# Patient Record
Sex: FEMALE | Race: WHITE | NOT HISPANIC OR LATINO | URBAN - NONMETROPOLITAN AREA
[De-identification: names, ages, dates, MRNs, and addresses within clinical notes are randomized per-mention and may not be internally consistent; named-entity substitution may affect disease eponyms.]

---

## 2019-12-04 ENCOUNTER — OFFICE VISIT CONVERTED (OUTPATIENT)
Dept: FAMILY MEDICINE CLINIC | Age: 39
End: 2019-12-04
Attending: NURSE PRACTITIONER

## 2019-12-04 ENCOUNTER — HOSPITAL ENCOUNTER (OUTPATIENT)
Dept: OTHER | Facility: HOSPITAL | Age: 39
Discharge: HOME | End: 2019-12-04
Attending: NURSE PRACTITIONER

## 2019-12-04 LAB
ALBUMIN SERPL-MCNC: 4.1 G/DL (ref 3.5–5)
ALBUMIN/GLOB SERPL: 1.5 {RATIO} (ref 1.4–2.6)
ALP SERPL-CCNC: 88 U/L (ref 42–98)
ALT SERPL-CCNC: 9 U/L (ref 10–40)
ANION GAP SERPL CALC-SCNC: 15 MMOL/L (ref 8–19)
APPEARANCE UR: CLEAR
AST SERPL-CCNC: 14 U/L (ref 15–50)
BILIRUB SERPL-MCNC: 0.19 MG/DL (ref 0.2–1.3)
BILIRUB UR QL: NEGATIVE
BUN SERPL-MCNC: 12 MG/DL (ref 5–25)
BUN/CREAT SERPL: 17 {RATIO} (ref 6–20)
CALCIUM SERPL-MCNC: 8.8 MG/DL (ref 8.7–10.4)
CHLORIDE SERPL-SCNC: 102 MMOL/L (ref 99–111)
COLOR UR: YELLOW
CONV BACTERIA: NEGATIVE
CONV CO2: 26 MMOL/L (ref 22–32)
CONV COLLECTION SOURCE (UA): NORMAL
CONV HYALINE CASTS IN URINE MICRO: NORMAL /[LPF]
CONV TOTAL PROTEIN: 6.9 G/DL (ref 6.3–8.2)
CONV UROBILINOGEN IN URINE BY AUTOMATED TEST STRIP: 1 {EHRLICHU}/DL (ref 0.1–1)
CREAT UR-MCNC: 0.71 MG/DL (ref 0.5–0.9)
ERYTHROCYTE [DISTWIDTH] IN BLOOD BY AUTOMATED COUNT: 12.8 % (ref 11.7–14.4)
GFR SERPLBLD BASED ON 1.73 SQ M-ARVRAT: >60 ML/MIN/{1.73_M2}
GLOBULIN UR ELPH-MCNC: 2.8 G/DL (ref 2–3.5)
GLUCOSE SERPL-MCNC: 90 MG/DL (ref 65–99)
GLUCOSE UR QL: NEGATIVE MG/DL
HCT VFR BLD AUTO: 42.7 % (ref 37–47)
HGB BLD-MCNC: 13.5 G/DL (ref 12–16)
HGB UR QL STRIP: NEGATIVE
KETONES UR QL STRIP: NEGATIVE MG/DL
LEUKOCYTE ESTERASE UR QL STRIP: NEGATIVE
MCH RBC QN AUTO: 29.2 PG (ref 27–31)
MCHC RBC AUTO-ENTMCNC: 31.6 G/DL (ref 33–37)
MCV RBC AUTO: 92.4 FL (ref 81–99)
NITRITE UR QL STRIP: NEGATIVE
OSMOLALITY SERPL CALC.SUM OF ELEC: 287 MOSM/KG (ref 273–304)
PH UR STRIP.AUTO: 6 [PH] (ref 5–8)
PLATELET # BLD AUTO: 267 10*3/UL (ref 130–400)
PMV BLD AUTO: 10.7 FL (ref 9.4–12.3)
POTASSIUM SERPL-SCNC: 3.6 MMOL/L (ref 3.5–5.3)
PROT UR QL: NEGATIVE MG/DL
RBC # BLD AUTO: 4.62 10*6/UL (ref 4.2–5.4)
RBC #/AREA URNS HPF: NORMAL /[HPF]
SODIUM SERPL-SCNC: 139 MMOL/L (ref 135–147)
SP GR UR: 1.03 (ref 1–1.03)
SQUAMOUS SPT QL MICRO: NORMAL /[HPF]
TSH SERPL-ACNC: 3.79 M[IU]/L (ref 0.27–4.2)
VIT B12 SERPL-MCNC: 685 PG/ML (ref 211–911)
WBC # BLD AUTO: 9.22 10*3/UL (ref 4.8–10.8)
WBC #/AREA URNS HPF: NORMAL /[HPF]

## 2019-12-05 LAB — MAGNESIUM SERPL-MCNC: 1.95 MG/DL (ref 1.6–2.3)

## 2019-12-06 LAB
CONV ANTI MICROSOMAL AB: 148 IU/ML (ref 0–34)
T4 FREE SERPL-MCNC: 1.1 NG/DL (ref 0.9–1.8)
THYROGLOBULIN ANTIBODY: 3 IU/ML (ref 0–0.9)

## 2019-12-07 LAB — T3FREE SERPL-MCNC: 2.9 PG/ML (ref 2–4.4)

## 2019-12-09 ENCOUNTER — CONVERSION ENCOUNTER (OUTPATIENT)
Dept: CARDIOLOGY | Facility: CLINIC | Age: 39
End: 2019-12-09
Attending: SPECIALIST

## 2019-12-10 ENCOUNTER — HOSPITAL ENCOUNTER (OUTPATIENT)
Dept: OTHER | Facility: HOSPITAL | Age: 39
Discharge: HOME | End: 2019-12-10
Attending: NURSE PRACTITIONER

## 2020-01-21 ENCOUNTER — OFFICE VISIT CONVERTED (OUTPATIENT)
Dept: CARDIOLOGY | Facility: CLINIC | Age: 40
End: 2020-01-21
Attending: INTERNAL MEDICINE

## 2020-01-21 ENCOUNTER — CONVERSION ENCOUNTER (OUTPATIENT)
Dept: CARDIOLOGY | Facility: CLINIC | Age: 40
End: 2020-01-21

## 2020-03-06 ENCOUNTER — HOSPITAL ENCOUNTER (OUTPATIENT)
Dept: OTHER | Facility: HOSPITAL | Age: 40
Discharge: HOME | End: 2020-03-06
Attending: INTERNAL MEDICINE

## 2020-03-06 LAB
T4 FREE SERPL-MCNC: 1.4 NG/DL (ref 0.9–1.8)
TSH SERPL-ACNC: 2.84 M[IU]/L (ref 0.27–4.2)

## 2020-03-07 LAB — T3FREE SERPL-MCNC: 3.1 PG/ML (ref 2–4.4)

## 2020-05-20 ENCOUNTER — OFFICE VISIT CONVERTED (OUTPATIENT)
Dept: CARDIOLOGY | Facility: CLINIC | Age: 40
End: 2020-05-20
Attending: INTERNAL MEDICINE

## 2020-05-20 ENCOUNTER — CONVERSION ENCOUNTER (OUTPATIENT)
Dept: OTHER | Facility: HOSPITAL | Age: 40
End: 2020-05-20

## 2020-06-05 ENCOUNTER — HOSPITAL ENCOUNTER (OUTPATIENT)
Dept: OTHER | Facility: HOSPITAL | Age: 40
Discharge: HOME | End: 2020-06-05
Attending: NURSE PRACTITIONER

## 2020-06-05 LAB
T4 FREE SERPL-MCNC: 1.4 NG/DL (ref 0.9–1.8)
TSH SERPL-ACNC: 2.99 M[IU]/L (ref 0.27–4.2)

## 2020-06-07 LAB — T3FREE SERPL-MCNC: 2.6 PG/ML (ref 2–4.4)

## 2020-06-17 ENCOUNTER — CONVERSION ENCOUNTER (OUTPATIENT)
Dept: CARDIOLOGY | Facility: CLINIC | Age: 40
End: 2020-06-17
Attending: INTERNAL MEDICINE

## 2020-07-31 ENCOUNTER — HOSPITAL ENCOUNTER (OUTPATIENT)
Dept: OTHER | Facility: HOSPITAL | Age: 40
Discharge: HOME | End: 2020-07-31
Attending: NURSE PRACTITIONER

## 2020-07-31 LAB — TSH SERPL-ACNC: 1.96 M[IU]/L (ref 0.27–4.2)

## 2020-10-03 ENCOUNTER — HOSPITAL ENCOUNTER (OUTPATIENT)
Dept: OTHER | Facility: HOSPITAL | Age: 40
Discharge: HOME | End: 2020-10-03
Attending: NURSE PRACTITIONER

## 2020-10-03 LAB — TSH SERPL-ACNC: 2.43 M[IU]/L (ref 0.27–4.2)

## 2020-11-24 ENCOUNTER — OFFICE VISIT CONVERTED (OUTPATIENT)
Dept: CARDIOLOGY | Facility: CLINIC | Age: 40
End: 2020-11-24
Attending: INTERNAL MEDICINE

## 2020-11-24 ENCOUNTER — CONVERSION ENCOUNTER (OUTPATIENT)
Dept: CARDIOLOGY | Facility: CLINIC | Age: 40
End: 2020-11-24

## 2020-12-04 ENCOUNTER — HOSPITAL ENCOUNTER (OUTPATIENT)
Dept: OTHER | Facility: HOSPITAL | Age: 40
Discharge: HOME | End: 2020-12-04
Attending: NURSE PRACTITIONER

## 2020-12-04 LAB
APPEARANCE UR: CLEAR
BACTERIA UR QL AUTO: ABNORMAL
BILIRUB UR QL: NEGATIVE
CASTS URNS QL MICRO: ABNORMAL /[LPF]
COLOR UR: YELLOW
CONV LEUKOCYTE ESTERASE: ABNORMAL
CONV UROBILINOGEN IN URINE BY AUTOMATED TEST STRIP: 0.2 {EHRLICHU}/DL (ref 0.1–1)
EPI CELLS #/AREA URNS HPF: ABNORMAL /[HPF]
GLUCOSE 24H UR-MCNC: NEGATIVE MG/DL
HGB UR QL STRIP: ABNORMAL
KETONES UR QL STRIP: NEGATIVE MG/DL
MUCOUS THREADS URNS QL MICRO: ABNORMAL
NITRITE UR-MCNC: NEGATIVE MG/ML
PH UR STRIP.AUTO: 5.5 [PH] (ref 5–8)
PROT UR-MCNC: NEGATIVE MG/DL
RBC # BLD AUTO: ABNORMAL /[HPF]
SP GR UR STRIP: 1.02 (ref 1–1.03)
SPECIMEN SOURCE: ABNORMAL
UNIDENT CRYS URNS QL MICRO: ABNORMAL /[HPF]
WBC #/AREA URNS HPF: ABNORMAL /[HPF]

## 2021-02-06 ENCOUNTER — HOSPITAL ENCOUNTER (OUTPATIENT)
Dept: OTHER | Facility: HOSPITAL | Age: 41
Discharge: HOME | End: 2021-02-06
Attending: NURSE PRACTITIONER

## 2021-02-06 LAB — TSH SERPL-ACNC: 2.33 M[IU]/L (ref 0.27–4.2)

## 2021-05-10 NOTE — PROCEDURES
"   Procedure Note      Patient Name: April Hawkins   Patient ID: [id number]   Sex: Female   YOB: 1980    Primary Care Provider: Catalino CASEY   Referring Provider: Catalino CASEY    Visit Date: June 17, 2020    Provider: Andre Fowler MD   Location: [facility]   Location Address: [address] [place] [address]  [address] [place] [address]  [id number]   Location Phone: [phone]          FINAL REPORT   TRANSTHORACIC ECHOCARDIOGRAM REPORT    Diagnosis: Palpitations and PVCS   Height: 5'6\" Weight: 192 B/P: 115/89 BSA: 1.97   Tech: JLW   MEASUREMENTS:  RVID (Diastole) : RVID. (NORMAL: 0.7 to 2.4 cm max)   LVID (Systole): 2.6 cm (Diastole): 3.7 cm . (NORMAL: 3.7 - 5.4 cm)   Posterior Wall Thickness (Diastole): 1 cm. (NORMAL: 0.8 - 1.1 cm)   Septal Thickness (Diastole): 0.7 cm. (NORMAL: 0.7 - 1.2 cm)   LAID (Systole): 2.7 cm. (NORMAL: 1.9 - 3.8 cm)   Aortic Root Diameter (Diastole): 2.6 cm. (NORMAL: 2.0 - 3.7 cm)   COMMENTS:  The patient underwent 2-D, M-Mode, and Doppler examination, including pulse-wave, continuous-wave, and color-flow Doppler analysis; the study is technically adequate. The following findings were noted:   FINDINGS:  MITRAL VALVE: Normal in appearance, opens well. No evidence of mitral valve prolapse. No mitral stenosis. There is mild mitral regurgitation.   AORTIC VALVE: Normal trileaflet aortic valve, opens well. No evidence of aortic stenosis or regurgitation on Doppler examination.   TRICUSPID VALVE: Normal in appearance, opens well. Trace tricuspid regurgitation. Normal pulmonary artery systolic pressure.   PULMONIC VALVE: Grossly normal.   AORTIC ROOT: Normal in size with adequate motion.   LEFT ATRIUM: Normal in size. No intracavitary masses or clots seen. LA volume index is 25 mL/m2.   LEFT VENTRICLE: The left ventricular chamber size is normal. The left ventricular wall thickness is normal. Left ventricular systolic function is normal. Estimated LV " ejection fraction is 55 to 60%. There are no regional wall motion abnormalities. Left ventricular diastolic function is normal.   RIGHT VENTRICLE: Normal size and function.   RIGHT ATRIUM: Normal in size.   PERICARDIUM: No evidence of pericardial effusion.   INFERIOR VENA CAVA: Measures 1.5 cm in diameter and there is more than 50% collapse during inspiration.   DOPPLER: E/A ratio is 1.3. DT= 175 msec. IVRT is 99 msec. E/E' is 8.   Faxed: 06/22/2020      CONCLUSION:  1.  Preserved left ventricular systolic function with estimated LV ejection fraction of 55 to 60%.   2.  Normal diastolic function of the left ventricle.  3.  Mild mitral regurgitation.      MD CRISTIANO Rendon/leandra    This note was transcribed by Adenike Mckay.  leandra/cristiano  The above service was transcribed by Adenike Mckay, and I attest to the accuracy of the note.  CRISTIANO                 Electronically Signed by: Nila Mckay-, Other -Author on June 22, 2020 08:50:25 AM  Electronically Co-signed by: Andre Fowler MD -Reviewer on June 22, 2020 01:07:05 PM

## 2021-05-13 NOTE — PROGRESS NOTES
Progress Note      Patient Name: April Hawkins   Patient ID: [id number]   Sex: Female   YOB: 1980    Primary Care Provider: Catalino CASEY   Referring Provider: Catalino CASEY    Visit Date: May 20, 2020    Provider: Andre Fowler MD   Location: [facility]   Location Address: [address] [place] [address]  [address] [place] [address]  [id number]   Location Phone: [phone]          Chief Complaint     Followup visit for palpitations and frequent PVCs.       History Of Present Illness  REFERRING CARE PROVIDER: Catalino CASEY   April Hawknis is a 39 year old /White female with no significant past medical history who was previously seen and evaluated for palpitations. A 24-hour Holter monitor study showed frequent isolated PVCs with no arrhythmias. Low-dose beta-blockers were initiated for symptomatic benefits. Patient reports that after starting the medication, she had an episode of swelling of the feet and tingling and numbness for 3 days, but she stayed on the medication and the symptoms eventually subsided. Seventy-five percent of her symptoms are gone. She still gets palpitations, mostly at night while lying down. She has not cut down on her caffeine intake and drinks Mountain Dew most of the day. She missed her appointment for the echocardiogram.   PAST MEDICAL HISTORY: 1) Hypothyroidism; 2) Negative for diabetes mellitus and hypertension.   PSYCHOSOCIAL HISTORY: Denies mood changes or depression. Denies alcohol or tobacco use.   CURRENT MEDICATIONS: Metoprolol 25 mg 1/2 daily; Unithroid 0.05 mg 1/2 daily. The dosage and frequency of the medications were reviewed with the patient.       Review of Systems  · Cardiovascular  o Admits  o : palpitations (fast, fluttering, or skipping beats), shortness of breath while walking or lying flat  o Denies  o : swelling (feet, ankles, hands), chest pain or angina pectoris   · Respiratory  o Denies  o : chronic or  "frequent cough, asthma or wheezing      Vitals  Date Time BP Position Site L\R Cuff Size HR RR TEMP (F) WT  HT  BMI kg/m2 BSA m2 O2 Sat HC       05/20/2020 12:36 /89 Sitting    78 - R   192lbs 0oz 5'  6\" 30.99 2.01           Physical Examination  · Respiratory  o Auscultation of Lungs  o : Clear to auscultation bilaterally. No crackles or rhonchi.  · Cardiovascular  o Heart  o : S1, S2 normally heard. No S3. No murmur, rubs, or gallops.  · Gastrointestinal  o Abdominal Examination  o : Soft, nontender, nondistended. No free fluid. Bowel sounds heard in all four quadrants.  · Extremities  o Extremities  o : Warm and well perfused. No pitting pedal edema. Distal pulses present.          Assessment     ASSESSMENT & PLAN:    1.  Palpitations/frequent PVCs.  Symptoms are better with metoprolol.  She is tolerating the medication well.         Continue the same.  Will reschedule the echocardiogram.    2.  Will follow with echo report, otherwise follow up in 6 months.      MD CRISTIANO Rendon/farrah             Electronically Signed by: Tere Fleming-, Other -Author on May 29, 2020 09:27:51 AM  Electronically Co-signed by: Andre Fowler MD -Reviewer on May 29, 2020 10:19:59 AM  "

## 2021-05-13 NOTE — PROGRESS NOTES
"   Progress Note      Patient Name: April Hawkins   Patient ID: [id number]   Sex: Female   YOB: 1980    Primary Care Provider: Catalino CASEY   Referring Provider: Catalino CASEY    Visit Date: November 24, 2020    Provider: Andre Fowler MD   Location: [facility]   Location Address: [address] [place] [address]  [address] [place] [address]  [id number]   Location Phone: [phone]          Chief Complaint  · Follow-up visit for palpitations and frequent PVCs       History Of Present Illness  REFERRING CARE PROVIDER: Catalino CASEY   April Hawkins is a 40-year-old female with palpitations due to frequent isolated PVCs, who is here for a follow-up visit. Last seen in the office in May of this year. Patient has significantly cut down her caffeine intake and is taking Metoprolol as prescribed. Currently she denies having any major palpitations. No dizziness or syncopal episodes. No generalized weakness. She denies chest pain or shortness of breath.   PAST MEDICAL HISTORY: (1) Hypothyroidism. (2) Negative for diabetes mellitus and hypertension. (3) Frequent PVCs [4.9% PVC burden per 24-hour Holter Monitor done in December 2019].   PSYCHOSOCIAL HISTORY: She never used alcohol.   CURRENT MEDICATIONS: include Metoprolol 25 mg 1/2 tablet daily; Levothyroxine 50 mcg daily. The dosage and frequency of the medications were reviewed with the patient.      ALLERGIES:  No known drug allergies.       Review of Systems  · Cardiovascular  o Denies  o : palpitations (fast, fluttering, or skipping beats), swelling (feet, ankles, hands), shortness of breath while walking or lying flat, chest pain or angina pectoris   · Respiratory  o Denies  o : chronic or frequent cough, asthma or wheezing      Vitals  Date Time BP Position Site L\R Cuff Size HR RR TEMP (F) WT  HT  BMI kg/m2 BSA m2 O2 Sat FR L/min FiO2 HC       11/24/2020 11:57 /80 Sitting    70 - R   197lbs 0oz 5'  6\" 31.8 2.04     "         Physical Examination  · Respiratory  o Auscultation of Lungs  o : Clear to auscultation bilaterally. No crackles or rhonchi.  · Cardiovascular  o Heart  o : S1, S2 is normally heard. No S3. No murmur, rubs, or gallops.  · Gastrointestinal  o Abdominal Examination  o : Soft, nontender, nondistended. No free fluid. Bowel sounds heard in all four quadrants.  · Extremities  o Extremities  o : Warm and well perfused. No pitting pedal edema. Distal pulses present.     Labs done on 10/03/2020 showed a TSH of 2.43.           Assessment     ASSESSMENT AND PLAN:    1.  Palpitations:  Due to frequent isolated PVCs.  Symptoms are significantly better after cutting down the        caffeine intake.  Will continue low-dose Metoprolol at the same time.  2.  Follow up in one year or earlier if needed.    MD CRISTIANO Rendon/bam           This note was transcribed by Aimee Villafana.  bam/CRISTIANO  The above service was transcribed by Aimee Villafana, and I attest to the accuracy of the note.  CRISTIANO               Electronically Signed by: Aimee Villafana-, -Author on December 1, 2020 09:50:38 AM  Electronically Co-signed by: Andre Fowler MD -Reviewer on December 1, 2020 11:00:41 AM

## 2021-05-14 VITALS
DIASTOLIC BLOOD PRESSURE: 80 MMHG | HEART RATE: 70 BPM | HEIGHT: 66 IN | SYSTOLIC BLOOD PRESSURE: 116 MMHG | WEIGHT: 197 LBS | BODY MASS INDEX: 31.66 KG/M2

## 2021-05-15 VITALS
HEART RATE: 81 BPM | WEIGHT: 187 LBS | BODY MASS INDEX: 30.05 KG/M2 | DIASTOLIC BLOOD PRESSURE: 79 MMHG | SYSTOLIC BLOOD PRESSURE: 109 MMHG | HEIGHT: 66 IN

## 2021-05-15 VITALS
BODY MASS INDEX: 30.86 KG/M2 | SYSTOLIC BLOOD PRESSURE: 115 MMHG | DIASTOLIC BLOOD PRESSURE: 89 MMHG | HEART RATE: 78 BPM | WEIGHT: 192 LBS | HEIGHT: 66 IN

## 2021-05-18 NOTE — PROGRESS NOTES
"Maria Eugenia Wilks  1980     Office/Outpatient Visit    Visit Date: Wed, Dec 4, 2019 02:37 pm    Provider: Jey Bae N.P. (Assistant: Remedios Silva MA)    Location: [facility]        Electronically signed by Jey Bae N.P. on  12/06/2019 10:16:39 AM                             Subjective:        CC: Ms. Wilks is a 39 year old White female.  This is her first visit to the clinic.  pt complains of a \"rapid, fluttering feeling\" in chest for past 6 weeks;         HPI:           PHQ-9 Depression Screening: Completed form scanned and in chart; Total Score 1           In regard to the palpitations, this first began approximately 6 weeks ago.  She describes the sensation as rapid heart beat and occasional skipped beats.  This is moderately bothersome.  The frequency of the episodes is several times per day.  The average duration of each episode is a few seconds to few minutes.  She states that it is worsened after notices it more at night but does happen when out doing things.  Nothing seems to alleviate the dizziness.  Associated symptoms include blurred vision, chest pain or pressure, dizziness and shortness of breath.  She denies double vision, headache, syncope, nausea, tinnitus or vomiting.  No new medications have been recently added.  Pertinent medical history is unremarkable.      ROS:     CONSTITUTIONAL:  Negative for chills, fatigue, fever, and weight change.      CARDIOVASCULAR:  Positive for palpitations.   Negative for chest pain, orthopnea, paroxysmal nocturnal dyspnea or pedal edema.      RESPIRATORY:  Negative for dyspnea.      GASTROINTESTINAL:  Negative for abdominal pain, constipation, diarrhea, nausea and vomiting.      NEUROLOGICAL:  Positive for dizziness and headaches ( migraine; hx of in the past ).   Negative for memory loss, paresthesias, seizures or tremor.      PSYCHIATRIC:  Negative for anxiety, depression, and sleep disturbances.          Past Medical " History / Family History / Social History:         Last Reviewed on 2019 03:04 PM by Jey Bae    Past Medical History:             PREVENTIVE HEALTH MAINTENANCE             MAMMOGRAM: has never been done will this coming year     PAP SMEAR: was last done 2019 with normal results             PAST MEDICAL HISTORY             GYNECOLOGICAL HISTORY:        No abnormal Paps         Surgical History:         Breast Augmentation - -saline and again 2018 -ruptured replaced with gel , below the muscle.      Varicose Vein Stripping     Weight loss with skin removal around stomach, legs , arm , separate surgeries 2017     Tonsillectomy; age  5;      section: X 2; , ;         Family History:     Father:    Positive for Hypertension and Hyperlipidemia;     ; Positive for Probable Bipolar disease;     Mother:    Positive for Hypertension and Hyperlipidemia;     ; Positive for Type 1 Diabetes ( dx age 64 ) and Hypothyroidism;     Brother(s): 1 brother - possible Bipolar brother(s) total     Sister(s): 1 sister , Bipolar sister(s) total     Son(s): 1 son healthy son(s) total     Daughter(s): 1 daugher healhty ,  daughter(s) total         Social History:     Occupation: [employer] service and [employer]     Marital Status:      Children: 2 children         Tobacco/Alcohol/Supplements:     Last Reviewed on 2019 03:04 PM by Jey Bae    Tobacco: She has never smoked.          Substance Abuse History:     Last Reviewed on 2019 03:04 PM by Jey Bae        Mental Health History:     Last Reviewed on 2019 03:04 PM by Jey Bae        Communicable Diseases (eg STDs):     Last Reviewed on 2019 03:04 PM by Jey Bae        Current Problems:     Last Reviewed on 2019 03:04 PM by Jey Bae    Encounter for screening for depression        Immunizations:     None        Allergies:     Last Reviewed on 2019 03:04  PM by Jey Bae    Keflex: Swelling         Current Medications:     Last Reviewed on 12/04/2019 03:04 PM by Jey Bae    None        Objective:        Vitals:         Current: 12/4/2019 2:47:05 PM    Ht:  5 ft, 6 in;  Wt: 183 lbs;  BMI: 29.5T: 98.2 F (oral);  BP: 101/73 mm Hg (left arm, sitting);  P: 94 bpm (left arm (BP Cuff), sitting)        Repeat:     3:23:13 PM  BP:   110/78mm Hg (left arm, lying, HR: 78) 3:23:58 PM  BP:   106/79mm Hg (left arm, sitting, HR: 92) 3:24:32 PM  BP:   102/86mm Hg (left arm, standing, HR: 109)     Exams:     PHYSICAL EXAM:     GENERAL: vital signs recorded - well developed, well nourished;  no apparent distress;     E/N/T:  normal EACs, TMs, nasal/oral mucosa, teeth, gingiva, and oropharynx;     NECK: range of motion is normal; thyroid is non-palpable; carotid exam is normal with good upstroke and no bruits;     RESPIRATORY: normal respiratory rate and pattern with no distress; normal breath sounds with no rales, rhonchi, wheezes or rubs;     CARDIOVASCULAR: mildly tachycardic;  rhythm is regular;  no systolic murmur; no edema;     GASTROINTESTINAL: nontender; normal bowel sounds; no organomegaly;     NEUROLOGICAL:  cranial nerves, motor and sensory function, reflexes, gait and coordination are all intact;     PSYCHIATRIC:  appropriate affect and demeanor; normal speech pattern; grossly normal memory;         Procedures:     Palpitations        Holter Monitor: Holter monitor was hooked up, Ms. Wilks was given instructions on use.  Patient informed to return with device. 48 hour monitor tls             Assessment:         Z13.31   Encounter for screening for depression       R00.2   Palpitations           ORDERS:         Radiology/Test Orders:       10433  Holter monitor connection and disconnection  (In-House)              Lab Orders:       59739  VB12 - [facility] Vitamin B12  (Send-Out)            89568  BDCB2 - H CBC w/o diff  (Send-Out)            06159  COMP - [facility]  Comp. Metabolic Panel  (Send-Out)            58523  AMSA - [facility] Microsomal Antibodies  (Send-Out)            77494  ARIANA - [facility] Thyroglobulin antibody  (Send-Out)            72863  TSH - [facility] TSH  (Send-Out)            36097  BDUAM - [facility] Urinalysis, automated, with micro  (Send-Out)            ORTHO  Orthostatic blood pressure  (In-House)              Other Orders:         Depression screen negative  (In-House)                      Plan:         Encounter for screening for depressionKasper new pt, # [id number] neg, dmt    MIPS PHQ-9 Depression Screening: Completed form scanned and in chart; Total Score 1; Negative Depression Screen           Orders:         Depression screen negative  (In-House)              Palpitations    LABORATORY:  Labs ordered to be performed today include B12, CBC W/O DIFF, Comprehensive metabolic panel, Thyroid Other ANTI MICROSOMAL ANTIBODY ANTITHYROGLOBULIN ANTIBODY, TSH, and urinalysis with micro.      TESTS/PROCEDURES:  Will proceed with Holter Monitor 48 hour to be performed/scheduled now.      RECOMMENDATIONS given include: Further recommendation to be given after test results are complete.            Orders:       11458  VB12 - [facility] Vitamin B12  (Send-Out)            74920  BDCB2 - [facility] CBC w/o diff  (Send-Out)            99475  COMP - [facility] Comp. Metabolic Panel  (Send-Out)            34740  AMSA - [facility] Microsomal Antibodies  (Send-Out)            33366  ARIANA - [facility] Thyroglobulin antibody  (Send-Out)            50456  TSH - [facility] TSH  (Send-Out)            86815  BDUAM - [facility] Urinalysis, automated, with micro  (Send-Out)            ORTHO  Orthostatic blood pressure  (In-House)            74957  Holter monitor connection and disconnection  (In-House)                  Charge Capture:         Primary Diagnosis:     Z13.31  Encounter for screening for depression           Orders:      81041  Office visit - new pt, level 3  (In-House)              Depression screen negative  (In-House)               R00.2  Palpitations           Orders:      ORTHO  Orthostatic blood pressure  (In-House)            95914  Holter monitor connection and disconnection  (In-House)                  ADDENDUMS:      ____________________________________    Addendum: 12/07/2019 11:08 AM - Basia Abbasi        Holter returned on 12/7/19 & uploaded to [facility]

## 2021-07-01 VITALS
HEIGHT: 66 IN | TEMPERATURE: 98.2 F | HEART RATE: 94 BPM | WEIGHT: 183 LBS | DIASTOLIC BLOOD PRESSURE: 86 MMHG | SYSTOLIC BLOOD PRESSURE: 102 MMHG | BODY MASS INDEX: 29.41 KG/M2

## 2021-08-11 ENCOUNTER — TRANSCRIBE ORDERS (OUTPATIENT)
Dept: ADMINISTRATIVE | Facility: HOSPITAL | Age: 41
End: 2021-08-11

## 2021-08-11 ENCOUNTER — LAB (OUTPATIENT)
Dept: LAB | Facility: HOSPITAL | Age: 41
End: 2021-08-11

## 2021-08-11 DIAGNOSIS — Z00.00 ROUTINE GENERAL MEDICAL EXAMINATION AT A HEALTH CARE FACILITY: ICD-10-CM

## 2021-08-11 DIAGNOSIS — Z00.00 ROUTINE GENERAL MEDICAL EXAMINATION AT A HEALTH CARE FACILITY: Primary | ICD-10-CM

## 2021-08-11 LAB
ALBUMIN SERPL-MCNC: 4.3 G/DL (ref 3.5–5.2)
ALBUMIN/GLOB SERPL: 1.5 G/DL
ALP SERPL-CCNC: 77 U/L (ref 39–117)
ALT SERPL W P-5'-P-CCNC: 11 U/L (ref 1–33)
ANION GAP SERPL CALCULATED.3IONS-SCNC: 12 MMOL/L (ref 5–15)
AST SERPL-CCNC: 12 U/L (ref 1–32)
BASOPHILS # BLD AUTO: 0.05 10*3/MM3 (ref 0–0.2)
BASOPHILS NFR BLD AUTO: 0.5 % (ref 0–1.5)
BILIRUB SERPL-MCNC: 0.4 MG/DL (ref 0–1.2)
BUN SERPL-MCNC: 14 MG/DL (ref 6–20)
BUN/CREAT SERPL: 18.9 (ref 7–25)
CALCIUM SPEC-SCNC: 9.4 MG/DL (ref 8.6–10.5)
CHLORIDE SERPL-SCNC: 102 MMOL/L (ref 98–107)
CHOLEST SERPL-MCNC: 180 MG/DL (ref 0–200)
CO2 SERPL-SCNC: 26 MMOL/L (ref 22–29)
CREAT SERPL-MCNC: 0.74 MG/DL (ref 0.57–1)
DEPRECATED RDW RBC AUTO: 43.5 FL (ref 37–54)
EOSINOPHIL # BLD AUTO: 0.09 10*3/MM3 (ref 0–0.4)
EOSINOPHIL NFR BLD AUTO: 0.9 % (ref 0.3–6.2)
ERYTHROCYTE [DISTWIDTH] IN BLOOD BY AUTOMATED COUNT: 13 % (ref 12.3–15.4)
GFR SERPL CREATININE-BSD FRML MDRD: 86 ML/MIN/1.73
GLOBULIN UR ELPH-MCNC: 2.8 GM/DL
GLUCOSE SERPL-MCNC: 87 MG/DL (ref 65–99)
HCT VFR BLD AUTO: 42.9 % (ref 34–46.6)
HDLC SERPL-MCNC: 64 MG/DL (ref 40–60)
HGB BLD-MCNC: 14 G/DL (ref 12–15.9)
IMM GRANULOCYTES # BLD AUTO: 0.01 10*3/MM3 (ref 0–0.05)
IMM GRANULOCYTES NFR BLD AUTO: 0.1 % (ref 0–0.5)
LDLC SERPL CALC-MCNC: 104 MG/DL (ref 0–100)
LDLC/HDLC SERPL: 1.61 {RATIO}
LYMPHOCYTES # BLD AUTO: 2.27 10*3/MM3 (ref 0.7–3.1)
LYMPHOCYTES NFR BLD AUTO: 22.7 % (ref 19.6–45.3)
MCH RBC QN AUTO: 29.4 PG (ref 26.6–33)
MCHC RBC AUTO-ENTMCNC: 32.6 G/DL (ref 31.5–35.7)
MCV RBC AUTO: 89.9 FL (ref 79–97)
MONOCYTES # BLD AUTO: 0.55 10*3/MM3 (ref 0.1–0.9)
MONOCYTES NFR BLD AUTO: 5.5 % (ref 5–12)
NEUTROPHILS NFR BLD AUTO: 7.03 10*3/MM3 (ref 1.7–7)
NEUTROPHILS NFR BLD AUTO: 70.3 % (ref 42.7–76)
PLATELET # BLD AUTO: 235 10*3/MM3 (ref 140–450)
PMV BLD AUTO: 10.3 FL (ref 6–12)
POTASSIUM SERPL-SCNC: 4.7 MMOL/L (ref 3.5–5.2)
PROT SERPL-MCNC: 7.1 G/DL (ref 6–8.5)
RBC # BLD AUTO: 4.77 10*6/MM3 (ref 3.77–5.28)
SODIUM SERPL-SCNC: 140 MMOL/L (ref 136–145)
TRIGL SERPL-MCNC: 65 MG/DL (ref 0–150)
TSH SERPL DL<=0.05 MIU/L-ACNC: 1.95 UIU/ML (ref 0.27–4.2)
VLDLC SERPL-MCNC: 12 MG/DL (ref 5–40)
WBC # BLD AUTO: 10 10*3/MM3 (ref 3.4–10.8)

## 2021-08-11 PROCEDURE — 36415 COLL VENOUS BLD VENIPUNCTURE: CPT

## 2021-08-11 PROCEDURE — 85025 COMPLETE CBC W/AUTO DIFF WBC: CPT

## 2021-08-11 PROCEDURE — 84443 ASSAY THYROID STIM HORMONE: CPT

## 2021-08-11 PROCEDURE — 80053 COMPREHEN METABOLIC PANEL: CPT

## 2021-08-11 PROCEDURE — 82306 VITAMIN D 25 HYDROXY: CPT

## 2021-08-11 PROCEDURE — 82607 VITAMIN B-12: CPT

## 2021-08-11 PROCEDURE — 80061 LIPID PANEL: CPT

## 2021-08-12 LAB
25(OH)D3 SERPL-MCNC: 60.5 NG/ML
VIT B12 BLD-MCNC: 1940 PG/ML (ref 211–946)

## 2021-08-23 ENCOUNTER — TELEPHONE (OUTPATIENT)
Dept: FAMILY MEDICINE CLINIC | Age: 41
End: 2021-08-23

## 2021-08-23 NOTE — TELEPHONE ENCOUNTER
Caller: April Hawkins    Relationship to patient: Self    Best call back number: [phone]    Patient is needing: PATIENT CALLED WANTING TO RESCHEDULE WITH HER CARDIOLOGIST ANISA CERRATO. SHE WANTS TO BE SEEN SOONER THAN 12/01/2021. PATIENT WOULD LIKE A CALL BACK PLEASE ADVISE THANK YOU.       HUB STAFF UNABLE TO WARM TRANSFER

## 2021-10-01 ENCOUNTER — OFFICE VISIT (OUTPATIENT)
Dept: FAMILY MEDICINE CLINIC | Age: 41
End: 2021-10-01

## 2021-10-01 VITALS — DIASTOLIC BLOOD PRESSURE: 65 MMHG | TEMPERATURE: 98 F | HEART RATE: 76 BPM | SYSTOLIC BLOOD PRESSURE: 123 MMHG

## 2021-10-01 DIAGNOSIS — E03.9 HYPOTHYROIDISM, UNSPECIFIED TYPE: Chronic | ICD-10-CM

## 2021-10-01 DIAGNOSIS — Z00.00 ANNUAL PHYSICAL EXAM: Primary | ICD-10-CM

## 2021-10-01 PROBLEM — R00.2 PALPITATIONS: Chronic | Status: ACTIVE | Noted: 2020-10-01

## 2021-10-01 PROCEDURE — 99396 PREV VISIT EST AGE 40-64: CPT | Performed by: NURSE PRACTITIONER

## 2021-10-01 RX ORDER — LEVOTHYROXINE SODIUM 0.05 MG/1
50 TABLET ORAL DAILY
COMMUNITY
End: 2021-10-01 | Stop reason: SDUPTHER

## 2021-10-01 RX ORDER — LEVOTHYROXINE SODIUM 0.05 MG/1
50 TABLET ORAL DAILY
Qty: 90 TABLET | Refills: 3 | Status: SHIPPED | OUTPATIENT
Start: 2021-10-01 | End: 2021-10-16 | Stop reason: SDUPTHER

## 2021-10-01 NOTE — PROGRESS NOTES
Mode of Visit: Video  You have chosen to receive care through a telehealth visit.  Do you consent to use a video/audio connection for your medical care today? YES   The visit included audio and video interaction. No technical issues occurred during this visit.      Subjective    Telehealth visit for yearly exam. LMP 9/18/2021, last Mammogram 11/2020 wnl, Last PAP 10/2020 wnl, labs done 8/11/2021 all stable. Sees Dr Cruz for palpitations taking Metoprolol 1/2 tablet daily , is stable, has been seeing Dr. Barr for her thyroid but wants to stay with [facility] instead.       Chief Complaint   Patient presents with   • Annual Exam         HPI:        April Hawkins is a 41 y.o. female who presents to  [facility]  [facility] today     Review of Systems   Constitutional: Negative for appetite change, chills and fever.   HENT: Negative.    Respiratory: Negative for cough, shortness of breath and wheezing.    Cardiovascular: Negative for chest pain, palpitations and leg swelling.   Gastrointestinal: Negative for abdominal pain.   Genitourinary: Negative for difficulty urinating and dysuria.   Musculoskeletal: Negative for gait problem.   Neurological: Negative for dizziness, tremors and seizures.   Psychiatric/Behavioral: Negative for behavioral problems and suicidal ideas.         Vital signs stated per patient from home machine    PE:   Objective   /65   Pulse 76   Temp 98 °F (36.7 °C)   LMP 09/18/2021 (Exact Date)   Breastfeeding No     There is no height or weight on file to calculate BMI.    Physical Exam   Constitutional: She appears well-developed and well-nourished.   Eyes: Pupils are equal, round, and reactive to light.   Pulmonary/Chest: Effort normal.   Musculoskeletal: Normal range of motion.   Neurological: She is alert.   Psychiatric: She has a normal mood and affect.   Vitals reviewed.             Vitamin B12 (08/11/2021 12:43)  Lipid Panel (08/11/2021  12:43)  CBC & Differential (08/11/2021 12:43)  Comprehensive Metabolic Panel (08/11/2021 12:43)  TSH (08/11/2021 12:43)  Vitamin D 25 Hydroxy (08/11/2021 12:43)           SCANNED - MAMMO (09/20/2021)             A/P:     Assessment/Plan   Diagnoses and all orders for this visit:    1. Annual physical exam (Primary)    2. Hypothyroidism, unspecified type  -     levothyroxine (SYNTHROID, LEVOTHROID) 50 MCG tablet; Take 1 tablet by mouth Daily.  Dispense: 90 tablet; Refill: 3              Follow up:    Return in about 1 year (around 10/1/2022), or if symptoms worsen or fail to improve.

## 2021-10-16 ENCOUNTER — DOCUMENTATION (OUTPATIENT)
Dept: FAMILY MEDICINE CLINIC | Age: 41
End: 2021-10-16

## 2021-10-16 DIAGNOSIS — E03.9 HYPOTHYROIDISM, UNSPECIFIED TYPE: Primary | Chronic | ICD-10-CM

## 2021-10-16 RX ORDER — LEVOTHYROXINE SODIUM 0.05 MG/1
50 TABLET ORAL DAILY
Qty: 90 TABLET | Refills: 1 | Status: SHIPPED | OUTPATIENT
Start: 2021-10-16 | End: 2022-03-24 | Stop reason: SDUPTHER

## 2021-12-01 ENCOUNTER — OFFICE VISIT (OUTPATIENT)
Dept: CARDIOLOGY | Facility: CLINIC | Age: 41
End: 2021-12-01

## 2021-12-01 VITALS
BODY MASS INDEX: 32.65 KG/M2 | HEART RATE: 77 BPM | HEIGHT: 65 IN | SYSTOLIC BLOOD PRESSURE: 120 MMHG | WEIGHT: 196 LBS | DIASTOLIC BLOOD PRESSURE: 80 MMHG

## 2021-12-01 DIAGNOSIS — I49.3 FREQUENT PVCS: Primary | ICD-10-CM

## 2021-12-01 PROCEDURE — 99213 OFFICE O/P EST LOW 20 MIN: CPT | Performed by: INTERNAL MEDICINE

## 2021-12-09 PROBLEM — I49.3 FREQUENT PVCS: Status: ACTIVE | Noted: 2021-12-09

## 2021-12-09 NOTE — ASSESSMENT & PLAN NOTE
Symptoms were well controlled in the past however currently experiencing more frequent palpitations.  She was off metoprolol for some time but recently started back without much benefit.  We will proceed with another Holter monitor study to assess the PVC burden and rule out any significant arrhythmias before making further medication changes.  Recent labs showed normal thyroid panel.

## 2021-12-09 NOTE — PROGRESS NOTES
CARDIOLOGY FOLLOW-UP PROGRESS NOTE        Chief Complaint  Follow-up and Palpitations    Subjective            April Hawkins presents to [facility] CARDIOLOGY  History of Present Illness    Ms. Hawkins is here for annual follow-up visit.  She was previously seen evaluated for palpitations and Holter monitor study showed frequent PVCs.  She was on metoprolol symptoms are very well controlled.  Patient stopped taking metoprolol for some time since she has not had any symptoms.  However she started having palpitations again hence restarted the medication.  The palpitations described as skipped beats is still persisting and they are more frequent now.  Denies any dizziness or syncopal episodes.  Denies chest pain or shortness of breath.       Past History:    (1) Hypothyroidism. (2) Negative for diabetes mellitus and hypertension. (3) Frequent PVCs [4.9% PVC burden per 24-hour Holter Monitor done in 2019].     Medical History:  Past Medical History:   Diagnosis Date   • Hypothyroidism    • Obesity    • Palpitations 10/01/2020       Surgical History: has a past surgical history that includes  section ();  section (); and Cosmetic surgery ().     Family History: family history includes Anxiety disorder in her sister; Arthritis in her mother; Depression in her sister; Diabetes in her mother; No Known Problems in her brother.     Social History: reports that she has never smoked. She has never used smokeless tobacco. She reports that she does not drink alcohol and does not use drugs.    Allergies: Cephalexin and Adhesive tape    Current Outpatient Medications on File Prior to Visit   Medication Sig   • levothyroxine (SYNTHROID, LEVOTHROID) 50 MCG tablet Take 1 tablet by mouth Daily for 90 days.   • Metoprolol Succinate 25 MG capsule extended-release 24 hour sprinkle Take 12.5 mg by mouth Daily.     No current facility-administered medications on file prior to visit.     "      Review of Systems   Respiratory: Negative for cough, shortness of breath and wheezing.    Cardiovascular: Positive for palpitations. Negative for chest pain and leg swelling.   Gastrointestinal: Negative for nausea and vomiting.   Neurological: Negative for dizziness and syncope.        Objective     /80   Pulse 77   Ht 165.1 cm (65\")   Wt 88.9 kg (196 lb)   BMI 32.62 kg/m²       Physical Exam    General : Alert, awake, no acute distress  Neck : Supple, no carotid bruit, no jugular venous distention  CVS : Regular rate and rhythm, no murmur, rubs or gallops  Lungs: Clear to auscultation bilaterally, no crackles or rhonchi  Abdomen: Soft, nontender, bowel sounds heard in all 4 quadrants  Extremities: Warm, well-perfused, no pedal edema    Result Review :     The following data was reviewed by: Andre Fowler MD on 12/01/2021:    CMP    CMP 8/11/21   Glucose 87   BUN 14   Creatinine 0.74   eGFR Non African Am 86   Sodium 140   Potassium 4.7   Chloride 102   Calcium 9.4   Albumin 4.30   Total Bilirubin 0.4   Alkaline Phosphatase 77   AST (SGOT) 12   ALT (SGPT) 11           CBC    CBC 8/11/21   WBC 10.00   RBC 4.77   Hemoglobin 14.0   Hematocrit 42.9   MCV 89.9   MCH 29.4   MCHC 32.6   RDW 13.0   Platelets 235           TSH    TSH 2/6/21 8/11/21   TSH 2.330 1.950           Lipid Panel    Lipid Panel 8/11/21   Total Cholesterol 180   Triglycerides 65   HDL Cholesterol 64 (A)   VLDL Cholesterol 12   LDL Cholesterol  104 (A)   LDL/HDL Ratio 1.61   (A) Abnormal value                 Data reviewed: Cardiology studies      Echocardiogram done on June 17, 2020 showed    1.  Preserved left ventricular systolic function with estimated LV ejection fraction of 55 to 60%.   2.  Normal diastolic function of the left ventricle.  3.  Mild mitral regurgitation.                   Assessment and Plan        Diagnoses and all orders for this visit:    1. Frequent PVCs (Primary)  Assessment & Plan:  Symptoms were well " controlled in the past however currently experiencing more frequent palpitations.  She was off metoprolol for some time but recently started back without much benefit.  We will proceed with another Holter monitor study to assess the PVC burden and rule out any significant arrhythmias before making further medication changes.  Recent labs showed normal thyroid panel.    Orders:  -     Holter Monitor - 48 Hour; Future            Follow Up     Return Will make f/u appointment after revewing holter reports.    Patient was given instructions and counseling regarding her condition or for health maintenance advice. Please see specific information pulled into the AVS if appropriate.

## 2021-12-10 ENCOUNTER — LAB (OUTPATIENT)
Dept: LAB | Facility: HOSPITAL | Age: 41
End: 2021-12-10

## 2021-12-10 ENCOUNTER — TELEPHONE (OUTPATIENT)
Dept: CARDIOLOGY | Facility: CLINIC | Age: 41
End: 2021-12-10

## 2021-12-10 DIAGNOSIS — R07.89 CHEST PAIN, ATYPICAL: ICD-10-CM

## 2021-12-10 DIAGNOSIS — I49.3 FREQUENT PVCS: Primary | ICD-10-CM

## 2021-12-10 DIAGNOSIS — I49.3 FREQUENT PVCS: ICD-10-CM

## 2021-12-10 LAB
ANION GAP SERPL CALCULATED.3IONS-SCNC: 4.5 MMOL/L (ref 5–15)
BUN SERPL-MCNC: 13 MG/DL (ref 6–20)
BUN/CREAT SERPL: 17.3 (ref 7–25)
CALCIUM SPEC-SCNC: 9.4 MG/DL (ref 8.6–10.5)
CHLORIDE SERPL-SCNC: 107 MMOL/L (ref 98–107)
CO2 SERPL-SCNC: 27.5 MMOL/L (ref 22–29)
CREAT SERPL-MCNC: 0.75 MG/DL (ref 0.57–1)
GFR SERPL CREATININE-BSD FRML MDRD: 85 ML/MIN/1.73
GLUCOSE SERPL-MCNC: 101 MG/DL (ref 65–99)
MAGNESIUM SERPL-MCNC: 2 MG/DL (ref 1.6–2.6)
POTASSIUM SERPL-SCNC: 4.9 MMOL/L (ref 3.5–5.2)
SODIUM SERPL-SCNC: 139 MMOL/L (ref 136–145)
T4 FREE SERPL-MCNC: 1.48 NG/DL (ref 0.93–1.7)
TSH SERPL DL<=0.05 MIU/L-ACNC: 2.47 UIU/ML (ref 0.27–4.2)

## 2021-12-10 PROCEDURE — 84443 ASSAY THYROID STIM HORMONE: CPT

## 2021-12-10 PROCEDURE — 80048 BASIC METABOLIC PNL TOTAL CA: CPT

## 2021-12-10 PROCEDURE — 36415 COLL VENOUS BLD VENIPUNCTURE: CPT

## 2021-12-10 PROCEDURE — 84439 ASSAY OF FREE THYROXINE: CPT

## 2021-12-10 PROCEDURE — 83735 ASSAY OF MAGNESIUM: CPT

## 2021-12-10 NOTE — TELEPHONE ENCOUNTER
----- Message from Andre Fowler MD sent at 12/9/2021  5:01 PM EST -----  This study is abnormal.  The total PVC burden is less than 1%.  However there were 2 runs of PVCs.  One of them lasted for 7.6 seconds with heart beating very fast.  She reported palpitations at that time.  Most of the symptomatic episodes reported by the patient coincided with the presence of PVCs on monitor strips.    Recommend to have the following things done soon.    1.  Increase the dose of Toprol-XL to 12.5 mg  twice daily.    2.  BMP, TSH, Free T4 and Mag levels to done now (we are aware that, she had labs done in August but need a repeat lab due to abnormal Holter monitor study, diagnosis : Frequent PVCs)     3.  CT coronary angiogram with 3D reconstruction to be done to rule out any blockages causing this significant extra heartbeats (diagnosis : Chest pain, frequent PVCs)      Electronically signed by Andre Fowler MD, 12/09/21, 5:01 PM EST.

## 2021-12-13 ENCOUNTER — TELEPHONE (OUTPATIENT)
Dept: CARDIOLOGY | Facility: CLINIC | Age: 41
End: 2021-12-13

## 2021-12-13 NOTE — TELEPHONE ENCOUNTER
----- Message from Soumya Sosa RN sent at 12/13/2021  7:52 AM EST -----    ----- Message -----  From: Andre Fowler MD  Sent: 12/11/2021   6:54 PM EST  To: Cathy Cordero RN    Thyroid panel, Magnesium, potassium and all other labs are within normal limits.     We will wait for CT coronary angiogram before making further recommendations.     Continue metoprolol

## 2021-12-16 DIAGNOSIS — R00.2 PALPITATIONS: ICD-10-CM

## 2021-12-16 DIAGNOSIS — I49.3 FREQUENT PVCS: Primary | ICD-10-CM

## 2021-12-16 RX ORDER — METOPROLOL SUCCINATE 25 MG/1
TABLET, EXTENDED RELEASE ORAL
Qty: 90 TABLET | Refills: 3 | Status: SHIPPED | OUTPATIENT
Start: 2021-12-16 | End: 2022-01-17

## 2022-01-17 ENCOUNTER — HOSPITAL ENCOUNTER (OUTPATIENT)
Dept: CT IMAGING | Facility: HOSPITAL | Age: 42
Discharge: HOME | End: 2022-01-17
Admitting: INTERNAL MEDICINE

## 2022-01-17 ENCOUNTER — DOCUMENTATION (OUTPATIENT)
Dept: CARDIOLOGY | Facility: CLINIC | Age: 42
End: 2022-01-17

## 2022-01-17 VITALS
HEART RATE: 71 BPM | SYSTOLIC BLOOD PRESSURE: 110 MMHG | BODY MASS INDEX: 31.34 KG/M2 | RESPIRATION RATE: 18 BRPM | WEIGHT: 195 LBS | OXYGEN SATURATION: 99 % | DIASTOLIC BLOOD PRESSURE: 82 MMHG | HEIGHT: 66 IN

## 2022-01-17 DIAGNOSIS — I49.3 FREQUENT PVCS: ICD-10-CM

## 2022-01-17 DIAGNOSIS — R00.2 PALPITATIONS: ICD-10-CM

## 2022-01-17 DIAGNOSIS — R07.89 CHEST PAIN, ATYPICAL: ICD-10-CM

## 2022-01-17 LAB
CREAT BLDA-MCNC: 0.8 MG/DL (ref 0.6–1.3)
QT INTERVAL: 389 MS

## 2022-01-17 PROCEDURE — 82565 ASSAY OF CREATININE: CPT

## 2022-01-17 PROCEDURE — 0 IOPAMIDOL PER 1 ML: Performed by: INTERNAL MEDICINE

## 2022-01-17 PROCEDURE — 75574 CT ANGIO HRT W/3D IMAGE: CPT

## 2022-01-17 PROCEDURE — 93005 ELECTROCARDIOGRAM TRACING: CPT | Performed by: INTERNAL MEDICINE

## 2022-01-17 PROCEDURE — 75574 CT ANGIO HRT W/3D IMAGE: CPT | Performed by: INTERNAL MEDICINE

## 2022-01-17 RX ORDER — METOPROLOL TARTRATE 5 MG/5ML
10 INJECTION INTRAVENOUS ONCE
Status: COMPLETED | OUTPATIENT
Start: 2022-01-17 | End: 2022-01-17

## 2022-01-17 RX ORDER — METOPROLOL SUCCINATE 25 MG/1
TABLET, EXTENDED RELEASE ORAL
Qty: 45 TABLET | Refills: 3 | Status: SHIPPED | OUTPATIENT
Start: 2022-01-17 | End: 2022-03-24 | Stop reason: SDUPTHER

## 2022-01-17 RX ADMIN — METOPROLOL TARTRATE 10 MG: 5 INJECTION, SOLUTION INTRAVENOUS at 10:21

## 2022-01-17 RX ADMIN — IOPAMIDOL 85 ML: 755 INJECTION, SOLUTION INTRAVENOUS at 11:27

## 2022-01-17 NOTE — PROGRESS NOTES
Cardiac CTA with morphology  01/17/22  Reason for the exam: Palpitations    Calcium score is 0 Agatston units.  This is between the 1-25 percentile for women between the ages of 40-44 years old.    Heart rate 58 bpm.  Left ventricular end-diastolic volume 101 mL.  Left ventricular end-systolic volume 48 mL.  Ejection fraction 53%.  Stroke volume 53 mL.  Cardiac output 3084 mL/m    The right atrium is normal in size.  The right ventricle is normal in size.  There is grossly normal right ventricular systolic function.  The pulmonary artery is normal in size.  There are 2 pulmonary veins entering the left side of the left atrium.  There are 3 pulmonary veins entering the right side of the left atrium.  The left atrial appendage was visualized and is without thrombus.  The intra-atrial septum appeared to be intact.  The left ventricle is normal in size with normal systolic function.  There was no evidence of a left ventricular thrombus.  The intraventricular septum appeared to be intact.  The mitral valve appeared structurally normal.  The aortic valve was trileaflet and appears structurally and functionally normal.  The pulmonic valve appeared structurally normal.  The tricuspid valve appeared structurally normal.  There was no pericardial effusion.  The pericardium appeared normal.    The left main coronary artery came off the left coronary cusp in its anticipated location.  It bifurcated into the left anterior descending artery and the circumflex coronary artery.  There was no evidence of atherosclerotic disease of the left main coronary artery.  Left anterior descending artery wraps around the apex of the heart.  There is no evidence of atherosclerotic disease.  The circumflex artery is the nondominant vessel with no evidence of atherosclerotic disease.  The right coronary artery is the dominant vessel with no evidence of atherosclerotic disease.    Conclusions:  1.  Normal coronary artery anatomy without evidence  of atherosclerotic disease.  Calcium score is 0 Agatston units.  2.  There are 2 pulmonary veins entering the left side of the left atrium.  There are 3 pulmonary veins entering the right side of the left atrium.  3.  There is normal left ventricular systolic function.  Ejection fraction calculated at 53%.    Carey Carvajal MD  01/17/22

## 2022-01-17 NOTE — TELEPHONE ENCOUNTER
Patient last OV 12.01.21      Medication was documented at that visit.       Next OV  NONE SCHEDULED

## 2022-01-17 NOTE — NURSING NOTE
Patient completed scan. Beverage provided. IV removed. Pt directed to Entrance A to exit independently.        Protective goggles and mask in place with all patient interactions today

## 2022-01-17 NOTE — NURSING NOTE
Patient arrived in Radiology triage for CTA.    Protective goggles and mask in place with all patient interactions today

## 2022-01-17 NOTE — NURSING NOTE
Called Jero Wylie with EKG HR of 80. She called Dr. Carvajal and called me back with order for IV Metoprolol.

## 2022-01-19 ENCOUNTER — TELEPHONE (OUTPATIENT)
Dept: CARDIOLOGY | Facility: CLINIC | Age: 42
End: 2022-01-19

## 2022-01-19 DIAGNOSIS — R00.2 PALPITATIONS: Primary | ICD-10-CM

## 2022-01-19 NOTE — PROGRESS NOTES
CT coronary done at [place] showed no blockages in the arteries of the heart.  Heart function is normal as well.    Continue taking Toprol-XL at the current dose.    She will need a follow-up visit in the next 4 to 6 weeks.  Please call us back for any increased frequency of palpitations in the meantime.    Okay to double book if needed for follow-up.      Electronically signed by Andre Fowler MD, 01/19/22, 2:51 PM EST.

## 2022-01-19 NOTE — TELEPHONE ENCOUNTER
"S/W patient and made aware of CTA results.    Patient stated that she is having constant \"movement in her heart.\" Asked patient to explain and she stated that it just feels like her \"heart is moving all the time.\" Asked if she was experiencing palpitations or a fast heart rate and patient stated it was both. Asked if it was at rest or with activity and she stated it was all the time.    Advised patient that I would discuss with Dr. Fowler and call back with recommendations.  "

## 2022-01-19 NOTE — TELEPHONE ENCOUNTER
----- Message from Andre Fowler MD sent at 1/19/2022  2:51 PM EST -----  CT coronary done at [place] showed no blockages in the arteries of the heart.  Heart function is normal as well.    Continue taking Toprol-XL at the current dose.    She will need a follow-up visit in the next 4 to 6 weeks.  Please call us back for any increased frequency of palpitations in the meantime.    Okay to double book if needed for follow-up.      Electronically signed by Andre Fowler MD, 01/19/22, 2:51 PM EST.

## 2022-01-19 NOTE — TELEPHONE ENCOUNTER
Recommend to have a 2 weeks event monitor study for further evaluation and also to assess the response to medications. She a;ready had a holter monitor       Diagnosis : palpitations      Electronically signed by Andre Fowler MD, 01/19/22, 3:24 PM EST.

## 2022-02-16 DIAGNOSIS — E03.9 HYPOTHYROIDISM, UNSPECIFIED TYPE: Primary | ICD-10-CM

## 2022-02-22 ENCOUNTER — TELEPHONE (OUTPATIENT)
Dept: FAMILY MEDICINE CLINIC | Age: 42
End: 2022-02-22

## 2022-02-25 ENCOUNTER — TELEPHONE (OUTPATIENT)
Dept: CARDIOLOGY | Facility: CLINIC | Age: 42
End: 2022-02-25

## 2022-03-01 ENCOUNTER — OFFICE VISIT (OUTPATIENT)
Dept: CARDIOLOGY | Facility: CLINIC | Age: 42
End: 2022-03-01

## 2022-03-01 VITALS
HEIGHT: 66 IN | BODY MASS INDEX: 31.82 KG/M2 | DIASTOLIC BLOOD PRESSURE: 82 MMHG | SYSTOLIC BLOOD PRESSURE: 125 MMHG | HEART RATE: 80 BPM | WEIGHT: 198 LBS

## 2022-03-01 DIAGNOSIS — I49.3 FREQUENT PVCS: Primary | ICD-10-CM

## 2022-03-01 PROCEDURE — 99213 OFFICE O/P EST LOW 20 MIN: CPT | Performed by: INTERNAL MEDICINE

## 2022-03-01 NOTE — PROGRESS NOTES
CARDIOLOGY FOLLOW-UP PROGRESS NOTE        Chief Complaint  Palpitations and Heart Problem (PVCs)    Subjective            April Hawkins presents to [facility] CARDIOLOGY  History of Present Illness    Ms. Hawkins is here for routine follow-up visit for palpitations.  Previously, she was noted to have significant PVC burden on Holter monitor study.  She is currently taking low-dose beta-blockers and and could not tolerate higher doses.  She underwent a 2-week event monitor study recently which did not show any significant PVC burden.  She had a CT coronary angiogram for intermittent chest pain/discomfort and high PVC burden, to rule out ischemic culprits.  CT coronary angiogram came back as negative.    Today patient reports feeling fine.  She has not had any palpitations for several weeks now.  Denies active chest pain, shortness of breath dizziness syncopal episodes.       Past History:    (1) Hypothyroidism. (2) Negative for diabetes mellitus and hypertension. (3) Frequent PVCs [4.9% PVC burden per 24-hour Holter Monitor done in 2019].     Medical History:  Past Medical History:   Diagnosis Date   • Hypothyroidism    • Obesity    • Palpitations 10/01/2020       Surgical History: has a past surgical history that includes  section ();  section (); and Cosmetic surgery ().     Family History: family history includes Anxiety disorder in her sister; Arthritis in her mother; Depression in her sister; Diabetes in her mother; No Known Problems in her brother.     Social History: reports that she has never smoked. She has never used smokeless tobacco. She reports that she does not drink alcohol and does not use drugs.    Allergies: Cephalexin and Adhesive tape    Current Outpatient Medications on File Prior to Visit   Medication Sig   • metoprolol succinate XL (TOPROL-XL) 25 MG 24 hr tablet TAKE 1/2 TABLET BY MOUTH EVERY DAY   • levothyroxine (SYNTHROID, LEVOTHROID) 50  "MCG tablet Take 1 tablet by mouth Daily for 90 days.     No current facility-administered medications on file prior to visit.          Review of Systems   Respiratory: Negative for cough, shortness of breath and wheezing.    Cardiovascular: Negative for chest pain, palpitations and leg swelling.   Gastrointestinal: Negative for nausea and vomiting.   Neurological: Negative for dizziness and syncope.        Objective     /82   Pulse 80   Ht 167.6 cm (66\")   Wt 89.8 kg (198 lb)   BMI 31.96 kg/m²       Physical Exam    General : Alert, awake, no acute distress  Neck : Supple, no carotid bruit, no jugular venous distention  CVS : Regular rate and rhythm, no murmur, rubs or gallops  Lungs: Clear to auscultation bilaterally, no crackles or rhonchi  Abdomen: Soft, nontender, bowel sounds heard in all 4 quadrants  Extremities: Warm, well-perfused, no pedal edema    Result Review :     The following data was reviewed by: Andre Fowler MD on 03/01/2022:    CMP    CMP 8/11/21 12/10/21 1/17/22   Glucose 87 101 (A)    BUN 14 13    Creatinine 0.74 0.75 0.80   eGFR Non African Am 86 85    Sodium 140 139    Potassium 4.7 4.9    Chloride 102 107    Calcium 9.4 9.4    Albumin 4.30     Total Bilirubin 0.4     Alkaline Phosphatase 77     AST (SGOT) 12     ALT (SGPT) 11     (A) Abnormal value       Comments are available for some flowsheets but are not being displayed.           CBC    CBC 8/11/21   WBC 10.00   RBC 4.77   Hemoglobin 14.0   Hematocrit 42.9   MCV 89.9   MCH 29.4   MCHC 32.6   RDW 13.0   Platelets 235           TSH    TSH 8/11/21 12/10/21   TSH 1.950 2.470           Lipid Panel    Lipid Panel 8/11/21   Total Cholesterol 180   Triglycerides 65   HDL Cholesterol 64 (A)   VLDL Cholesterol 12   LDL Cholesterol  104 (A)   LDL/HDL Ratio 1.61   (A) Abnormal value                 Data reviewed: Cardiology studies        2-week event monitor study done from 2/9/2022 showed    · A relatively benign 2-week event monitor " study.  · Underlying rhythm is normal sinus rhythm with episodes of sinus tachycardia with an average heart rate 101 bpm.  · There were no atrial or ventricular arrhythmias.  · No significant ectopy burden noted.    CT coronary angiogram done on 1/17/2022 showed    1.  Normal coronary artery anatomy without evidence of atherosclerotic disease.  Calcium score is 0 Agatston units.  2.  There are 2 pulmonary veins entering the left side of the left atrium.  There are 3 pulmonary veins entering the right side of the left atrium.  3.  There is normal left ventricular systolic function.  Ejection fraction calculated at 53%.      Echocardiogram from June, 2020 showed    1.  Preserved left ventricular systolic function with estimated LVEF of 55 to 60%.   2.  Normal diastolic function of the left ventricle.  3.  Mild mitral regurgitation.           Assessment and Plan        Diagnoses and all orders for this visit:    1. Frequent PVCs (Primary)  Assessment & Plan:  Symptoms are very well controlled at this time.  Recent 2-week event monitor study did not show any significant PVC burden.  CT coronary angiogram is negative for ischemic culprits.  Continue low-dose beta-blockers without changes.  Counseled again regarding decreasing caffeine intake and avoiding decongestant medications.              Follow Up     Return in about 9 months (around 12/1/2022) for Next scheduled follow up.    Patient was given instructions and counseling regarding her condition or for health maintenance advice. Please see specific information pulled into the AVS if appropriate.

## 2022-03-01 NOTE — ASSESSMENT & PLAN NOTE
Symptoms are very well controlled at this time.  Recent 2-week event monitor study did not show any significant PVC burden.  CT coronary angiogram is negative for ischemic culprits.  Continue low-dose beta-blockers without changes.  Counseled again regarding decreasing caffeine intake and avoiding decongestant medications.

## 2022-03-24 ENCOUNTER — OFFICE VISIT (OUTPATIENT)
Dept: FAMILY MEDICINE CLINIC | Age: 42
End: 2022-03-24

## 2022-03-24 VITALS
DIASTOLIC BLOOD PRESSURE: 84 MMHG | HEART RATE: 104 BPM | SYSTOLIC BLOOD PRESSURE: 109 MMHG | BODY MASS INDEX: 31.51 KG/M2 | WEIGHT: 195.2 LBS

## 2022-03-24 DIAGNOSIS — I49.3 FREQUENT PVCS: ICD-10-CM

## 2022-03-24 DIAGNOSIS — R00.2 PALPITATIONS: ICD-10-CM

## 2022-03-24 DIAGNOSIS — E03.9 HYPOTHYROIDISM, UNSPECIFIED TYPE: Chronic | ICD-10-CM

## 2022-03-24 DIAGNOSIS — F43.23 ADJUSTMENT REACTION WITH ANXIETY AND DEPRESSION: Primary | ICD-10-CM

## 2022-03-24 PROCEDURE — 99213 OFFICE O/P EST LOW 20 MIN: CPT | Performed by: NURSE PRACTITIONER

## 2022-03-24 RX ORDER — LEVOTHYROXINE SODIUM 0.05 MG/1
50 TABLET ORAL DAILY
Qty: 90 TABLET | Refills: 1 | Status: SHIPPED | OUTPATIENT
Start: 2022-03-24 | End: 2022-07-12 | Stop reason: SDUPTHER

## 2022-03-24 RX ORDER — FLUOXETINE HYDROCHLORIDE 20 MG/1
20 CAPSULE ORAL DAILY
Qty: 90 CAPSULE | Refills: 0 | Status: SHIPPED | OUTPATIENT
Start: 2022-03-24 | End: 2022-06-13 | Stop reason: SDUPTHER

## 2022-03-24 RX ORDER — METOPROLOL SUCCINATE 25 MG/1
TABLET, EXTENDED RELEASE ORAL
Qty: 45 TABLET | Refills: 3 | Status: SHIPPED | OUTPATIENT
Start: 2022-03-24 | End: 2022-12-06 | Stop reason: SDUPTHER

## 2022-03-24 NOTE — PROGRESS NOTES
Chief Complaint  April Hawkins presents to [facility] for Hypothyroidism and Palpitations    Subjective          History of Present Illness   Here for follow up on thyroid and palipations, taking 1/2 tablet of Metoprolol but it does cause her to be tired, and some depressed feeling but is also having issues with family, her sister and her children that is stressing her and causing her to be depressed      Review of Systems   Constitutional: Negative for fatigue, fever, unexpected weight gain and unexpected weight loss.   HENT: Negative.    Eyes: Negative.    Respiratory: Negative for cough, shortness of breath and wheezing.    Cardiovascular: Negative for chest pain, palpitations and leg swelling.   Gastrointestinal: Negative for abdominal pain.   Endocrine: Negative.    Genitourinary: Negative for breast lump, breast pain, dysuria, frequency and urgency.   Musculoskeletal: Negative for gait problem.   Skin: Negative.    Neurological: Negative for dizziness, tremors, seizures, weakness and memory problem.   Psychiatric/Behavioral: Positive for dysphoric mood, depressed mood and stress. Negative for behavioral problems and suicidal ideas.         Allergies   Allergen Reactions   • Cephalexin Anaphylaxis   • Adhesive Tape Dermatitis     dermabond      Past Medical History:   Diagnosis Date   • Hypothyroidism    • Obesity    • Palpitations 10/01/2020     Current Outpatient Medications   Medication Sig Dispense Refill   • levothyroxine (SYNTHROID, LEVOTHROID) 50 MCG tablet Take 1 tablet by mouth Daily for 90 days. 90 tablet 1   • metoprolol succinate XL (TOPROL-XL) 25 MG 24 hr tablet TAKE 1/2 TABLET BY MOUTH EVERY DAY 45 tablet 3   • FLUoxetine (PROzac) 20 MG capsule Take 1 capsule by mouth Daily. 90 capsule 0     No current facility-administered medications for this visit.     Past Surgical History:   Procedure Laterality Date   •  SECTION     •  SECTION     •  COSMETIC SURGERY  2016      Social History     Tobacco Use   • Smoking status: Never Smoker   • Smokeless tobacco: Never Used   Vaping Use   • Vaping Use: Never used   Substance Use Topics   • Alcohol use: Never   • Drug use: Never     Family History   Problem Relation Age of Onset   • Arthritis Mother    • Diabetes Mother    • Anxiety disorder Sister    • Depression Sister    • No Known Problems Brother      Health Maintenance Due   Topic Date Due   • TDAP/TD VACCINES (1 - Tdap) Never done   • INFLUENZA VACCINE  Never done   • COVID-19 Vaccine (3 - Booster for Pfizer series) 02/20/2022      Immunization History   Administered Date(s) Administered   • COVID-19 (PFIZER) PURPLE CAP 08/30/2021, 09/20/2021        Objective     Vitals:    03/24/22 1326   BP: 109/84   BP Location: Right arm   Patient Position: Sitting   Pulse: 104   Weight: 88.5 kg (195 lb 3.2 oz)     Body mass index is 31.51 kg/m².     Physical Exam  Constitutional:       Appearance: Normal appearance.   Neck:      Vascular: No carotid bruit.   Cardiovascular:      Rate and Rhythm: Normal rate and regular rhythm.      Heart sounds: Normal heart sounds.   Pulmonary:      Effort: Pulmonary effort is normal.      Breath sounds: Normal breath sounds.   Musculoskeletal:         General: Normal range of motion.   Skin:     General: Skin is warm and dry.   Neurological:      General: No focal deficit present.      Mental Status: She is alert.   Psychiatric:         Mood and Affect: Mood is depressed. Affect is tearful.         Behavior: Behavior normal.         Thought Content: Thought content normal.         Judgment: Judgment normal.           Result Review :    Ancillary Procedure on 02/09/2022   Component Date Value Ref Range Status   • Target HR (85%) 02/09/2022 151  bpm Final   • Max. Pred. HR (100%) 02/09/2022 178  bpm Final   Hospital Outpatient Visit on 01/17/2022   Component Date Value Ref Range Status   • QT Interval 01/17/2022 389  ms Final   •  Creatinine 01/17/2022 0.80  0.60 - 1.30 mg/dL Final   Lab on 12/10/2021   Component Date Value Ref Range Status   • Magnesium 12/10/2021 2.0  1.6 - 2.6 mg/dL Final   • Glucose 12/10/2021 101 (A) 65 - 99 mg/dL Final   • BUN 12/10/2021 13  6 - 20 mg/dL Final   • Creatinine 12/10/2021 0.75  0.57 - 1.00 mg/dL Final   • Sodium 12/10/2021 139  136 - 145 mmol/L Final   • Potassium 12/10/2021 4.9  3.5 - 5.2 mmol/L Final   • Chloride 12/10/2021 107  98 - 107 mmol/L Final   • CO2 12/10/2021 27.5  22.0 - 29.0 mmol/L Final   • Calcium 12/10/2021 9.4  8.6 - 10.5 mg/dL Final   • eGFR Non African Amer 12/10/2021 85  >60 mL/min/1.73 Final   • BUN/Creatinine Ratio 12/10/2021 17.3  7.0 - 25.0 Final   • Anion Gap 12/10/2021 4.5 (A) 5.0 - 15.0 mmol/L Final   • TSH 12/10/2021 2.470  0.270 - 4.200 uIU/mL Final   • Free T4 12/10/2021 1.48  0.93 - 1.70 ng/dL Final   Ancillary Procedure on 12/01/2021   Component Date Value Ref Range Status   • Target HR (85%) 12/01/2021 151  bpm Final   • Max. Pred. HR (100%) 12/01/2021 178  bpm Final                        Assessment and Plan      Diagnoses and all orders for this visit:    1. Adjustment reaction with anxiety and depression (Primary)  Comments:  Take daily, call in 1 mo to let me know how your doing   Orders:  -     FLUoxetine (PROzac) 20 MG capsule; Take 1 capsule by mouth Daily.  Dispense: 90 capsule; Refill: 0    2. Frequent PVCs  -     metoprolol succinate XL (TOPROL-XL) 25 MG 24 hr tablet; TAKE 1/2 TABLET BY MOUTH EVERY DAY  Dispense: 45 tablet; Refill: 3    3. Palpitations  -     metoprolol succinate XL (TOPROL-XL) 25 MG 24 hr tablet; TAKE 1/2 TABLET BY MOUTH EVERY DAY  Dispense: 45 tablet; Refill: 3    4. Hypothyroidism, unspecified type  -     levothyroxine (SYNTHROID, LEVOTHROID) 50 MCG tablet; Take 1 tablet by mouth Daily for 90 days.  Dispense: 90 tablet; Refill: 1            Follow Up     Return in about 1 month (around 4/24/2022).

## 2022-05-06 NOTE — TELEPHONE ENCOUNTER
Hub staff attempted to follow warm transfer process and was unsuccessful     Caller: April Hawkins    Relationship to patient: Self    Best call back number: [phone]    Patient is needing: THE PATIENT WOULD LIKE A CALL BACK TO DISCUSS RETURNING HOLTER MONITOR      
Spoke to patient. She had holter placed with Cardiology upstairs. Needing to know what their hours were to return it//ag   
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     Bladder suspension     Corneal abnormality s/p left corneal transplant 1985    Gastric Bypass Status for Obesity s/p gastric bypass 2002 275lb weight loss    H/O abdominal hysterectomy left salpingo oophorectomy 2002    H/O kyphoplasty     hiatal hernia repair surgical repair 7/11;    History of arthroscopy of knee  right     History of colon resection 1986    History of colonoscopy     History of lumbar fusion 3/2020    History of other surgery hernia repair    left corneal transplant     Lung abnormality septic emboli 4/08, right lower lobe procedure and thoracentesis    S/P ablation of atrial fibrillation     S/P appendectomy     S/P Cholecystectomy     S/P knee replacement bilateral    S/P laparotomy removed and replaced mesh    S/P total knee replacement right 2015, left 2016    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

## 2022-06-13 ENCOUNTER — DOCUMENTATION (OUTPATIENT)
Dept: FAMILY MEDICINE CLINIC | Age: 42
End: 2022-06-13

## 2022-06-13 DIAGNOSIS — F43.23 ADJUSTMENT REACTION WITH ANXIETY AND DEPRESSION: Primary | ICD-10-CM

## 2022-06-13 RX ORDER — FLUOXETINE HYDROCHLORIDE 20 MG/1
20 CAPSULE ORAL DAILY
Qty: 90 CAPSULE | Refills: 3 | Status: SHIPPED | OUTPATIENT
Start: 2022-06-13 | End: 2022-12-06 | Stop reason: SDUPTHER

## 2022-06-15 ENCOUNTER — TELEPHONE (OUTPATIENT)
Dept: FAMILY MEDICINE CLINIC | Age: 42
End: 2022-06-15

## 2022-06-15 NOTE — TELEPHONE ENCOUNTER
----- Message from Karley Adair LPN sent at 6/15/2022  8:05 AM EDT -----      ----- Message -----  From: SYSTEM  Sent: 6/15/2022   1:11 AM EDT  To: [facility]

## 2022-07-08 ENCOUNTER — LAB (OUTPATIENT)
Dept: LAB | Facility: HOSPITAL | Age: 42
End: 2022-07-08

## 2022-07-08 DIAGNOSIS — E03.9 HYPOTHYROIDISM, UNSPECIFIED TYPE: ICD-10-CM

## 2022-07-08 PROCEDURE — 84443 ASSAY THYROID STIM HORMONE: CPT

## 2022-07-08 PROCEDURE — 36415 COLL VENOUS BLD VENIPUNCTURE: CPT

## 2022-07-09 LAB — TSH SERPL DL<=0.05 MIU/L-ACNC: 1.35 UIU/ML (ref 0.27–4.2)

## 2022-07-12 DIAGNOSIS — E03.9 HYPOTHYROIDISM, UNSPECIFIED TYPE: Chronic | ICD-10-CM

## 2022-07-12 DIAGNOSIS — E03.9 HYPOTHYROIDISM, UNSPECIFIED TYPE: Primary | ICD-10-CM

## 2022-07-12 RX ORDER — LEVOTHYROXINE SODIUM 0.05 MG/1
50 TABLET ORAL DAILY
Qty: 90 TABLET | Refills: 1 | Status: SHIPPED | OUTPATIENT
Start: 2022-07-12 | End: 2022-12-06 | Stop reason: SDUPTHER

## 2022-12-06 ENCOUNTER — OFFICE VISIT (OUTPATIENT)
Dept: FAMILY MEDICINE CLINIC | Age: 42
End: 2022-12-06

## 2022-12-06 ENCOUNTER — OFFICE VISIT (OUTPATIENT)
Dept: CARDIOLOGY | Facility: CLINIC | Age: 42
End: 2022-12-06

## 2022-12-06 VITALS
OXYGEN SATURATION: 98 % | HEIGHT: 66 IN | SYSTOLIC BLOOD PRESSURE: 108 MMHG | WEIGHT: 206.2 LBS | BODY MASS INDEX: 33.14 KG/M2 | TEMPERATURE: 97.8 F | DIASTOLIC BLOOD PRESSURE: 73 MMHG | HEART RATE: 92 BPM

## 2022-12-06 VITALS
WEIGHT: 205.6 LBS | DIASTOLIC BLOOD PRESSURE: 79 MMHG | BODY MASS INDEX: 33.04 KG/M2 | HEART RATE: 72 BPM | SYSTOLIC BLOOD PRESSURE: 111 MMHG | HEIGHT: 66 IN

## 2022-12-06 DIAGNOSIS — R00.2 PALPITATIONS: ICD-10-CM

## 2022-12-06 DIAGNOSIS — I49.3 FREQUENT PVCS: ICD-10-CM

## 2022-12-06 DIAGNOSIS — F43.23 ADJUSTMENT REACTION WITH ANXIETY AND DEPRESSION: ICD-10-CM

## 2022-12-06 DIAGNOSIS — E03.9 HYPOTHYROIDISM, UNSPECIFIED TYPE: Primary | Chronic | ICD-10-CM

## 2022-12-06 DIAGNOSIS — I49.3 FREQUENT PVCS: Primary | ICD-10-CM

## 2022-12-06 PROCEDURE — 99213 OFFICE O/P EST LOW 20 MIN: CPT | Performed by: INTERNAL MEDICINE

## 2022-12-06 PROCEDURE — 99213 OFFICE O/P EST LOW 20 MIN: CPT | Performed by: NURSE PRACTITIONER

## 2022-12-06 RX ORDER — LEVOTHYROXINE SODIUM 0.05 MG/1
50 TABLET ORAL DAILY
Qty: 90 TABLET | Refills: 1 | Status: SHIPPED | OUTPATIENT
Start: 2022-12-06 | End: 2023-03-06

## 2022-12-06 RX ORDER — FLUOXETINE HYDROCHLORIDE 20 MG/1
20 CAPSULE ORAL DAILY
Qty: 90 CAPSULE | Refills: 3 | Status: SHIPPED | OUTPATIENT
Start: 2022-12-06

## 2022-12-06 RX ORDER — METOPROLOL SUCCINATE 25 MG/1
TABLET, EXTENDED RELEASE ORAL
Qty: 45 TABLET | Refills: 3 | Status: SHIPPED | OUTPATIENT
Start: 2022-12-06

## 2022-12-06 NOTE — PROGRESS NOTES
Assessment and Plan   Follow Up   Return in about 6 months (around 6/6/2023) for Annual physical.      Diagnoses and all orders for this visit:    1. Hypothyroidism, unspecified type (Primary)  Comments:  stable on current medication - labs at next visit    Orders:  -     levothyroxine (SYNTHROID, LEVOTHROID) 50 MCG tablet; Take 1 tablet by mouth Daily for 90 days.  Dispense: 90 tablet; Refill: 1    2. Palpitations  Comments:  stable on current treatment   follow up at next visit   Orders:  -     metoprolol succinate XL (TOPROL-XL) 25 MG 24 hr tablet; TAKE 1/2 TABLET BY MOUTH EVERY DAY  Dispense: 45 tablet; Refill: 3    3. Adjustment reaction with anxiety and depression  Comments:  stable on current treatment  no concerns at this time, continue current dose   Orders:  -     FLUoxetine (PROzac) 20 MG capsule; Take 1 capsule by mouth Daily.  Dispense: 90 capsule; Refill: 3    4. Frequent PVCs  -     metoprolol succinate XL (TOPROL-XL) 25 MG 24 hr tablet; TAKE 1/2 TABLET BY MOUTH EVERY DAY  Dispense: 45 tablet; Refill: 3            New Medications Ordered This Visit   Medications   • metoprolol succinate XL (TOPROL-XL) 25 MG 24 hr tablet     Sig: TAKE 1/2 TABLET BY MOUTH EVERY DAY     Dispense:  45 tablet     Refill:  3   • levothyroxine (SYNTHROID, LEVOTHROID) 50 MCG tablet     Sig: Take 1 tablet by mouth Daily for 90 days.     Dispense:  90 tablet     Refill:  1   • FLUoxetine (PROzac) 20 MG capsule     Sig: Take 1 capsule by mouth Daily.     Dispense:  90 capsule     Refill:  3                 Chief Complaint  April Hawkins presents to [facility] for Follow-up (CC: anxiety, hypothyroidism, & palpitations. Patient feels like medications have been working well for her, no complaints. /Patient needing medication refills. ) and Establish Care (Patient wanting to change PCP due to Lolis Mccoy not being here anymore. )    Subjective          History of Present Illness  April is here for  "follow up on depression.  She is reporting that Her medication is working well without side effects.  Current treatment includes Fluoxetine. Has had some weight gain, but otherwise, working well      April presents for follow up on hypothyroidism. Denies symptoms of thyroid dysfunction at this time.    Reports daily compliance with levothyroxine (Synthroid)50mcg.  Denies any missed doses.    Last Lab Results       Component                Value               Date                       TSH                      1.350               07/08/2022      Palpitations have been stable, no current concerns  Taking metoprolol as prescribed                   Review of Systems    Objective     Vitals:    12/06/22 0949   BP: 108/73   BP Location: Right arm   Patient Position: Sitting   Cuff Size: Adult   Pulse: 92   Temp: 97.8 °F (36.6 °C)   TempSrc: Oral   SpO2: 98%   Weight: 93.5 kg (206 lb 3.2 oz)   Height: 167.6 cm (66\")     Body mass index is 33.28 kg/m².     Physical Exam  Constitutional:       General: She is not in acute distress.     Appearance: Normal appearance.   HENT:      Head: Normocephalic.   Cardiovascular:      Rate and Rhythm: Normal rate and regular rhythm.   Pulmonary:      Effort: Pulmonary effort is normal.      Breath sounds: Normal breath sounds.   Musculoskeletal:         General: Normal range of motion.   Neurological:      General: No focal deficit present.      Mental Status: She is alert and oriented to person, place, and time.   Psychiatric:         Mood and Affect: Mood normal.         Behavior: Behavior normal.         Result Review                         Allergies   Allergen Reactions   • Cephalexin Anaphylaxis   • Adhesive Tape Dermatitis     dermabond      Past Medical History:   Diagnosis Date   • Hypothyroidism    • Obesity    • Palpitations 10/01/2020     Current Outpatient Medications   Medication Sig Dispense Refill   • FLUoxetine (PROzac) 20 MG capsule Take 1 capsule by mouth Daily. 90 " capsule 3   • levothyroxine (SYNTHROID, LEVOTHROID) 50 MCG tablet Take 1 tablet by mouth Daily for 90 days. 90 tablet 1   • metoprolol succinate XL (TOPROL-XL) 25 MG 24 hr tablet TAKE 1/2 TABLET BY MOUTH EVERY DAY 45 tablet 3     No current facility-administered medications for this visit.     Past Surgical History:   Procedure Laterality Date   •  SECTION     •  SECTION     • COSMETIC SURGERY  2016      Health Maintenance Due   Topic Date Due   • TDAP/TD VACCINES (1 - Tdap) Never done   • HEPATITIS C SCREENING  Never done      Immunization History   Administered Date(s) Administered   • COVID-19 (PFIZER) PURPLE CAP 2021, 2021

## 2022-12-06 NOTE — ASSESSMENT & PLAN NOTE
Symptoms are reasonably well controlled.  No significant ectopy burden on auscultation today.  Previous echocardiogram showed normal LV function and no significant valvular abnormalities.  CT coronary angiogram showed normal coronary arteries as well.  Recommend to continue Toprol-XL 25 mg  half tablet once daily.

## 2022-12-06 NOTE — PROGRESS NOTES
CARDIOLOGY FOLLOW-UP PROGRESS NOTE        Chief Complaint  Follow-up and Palpitations    Subjective            April Hawkins presents to [facility]  History of Present Illness      Ms. Hawkins is here for annual follow up visit for palpitations and frequent PVCs.  She has no significant symptoms at this time.  She rarely gets palpitations described as heart fluttering but they are random and happening only once in few weeks.  Denies any dizziness.  Chest pain episodes completely subsided.  No shortness of breath or syncopal episodes.  She is taking metoprolol 12 point milligrams daily as prescribed earlier.       Past History:       (1) Hypothyroidism. (2) Negative for diabetes mellitus and hypertension. (3) Frequent PVCs [4.9% PVC burden per 24-hour Holter Monitor done in 2019].     Medical History:  Past Medical History:   Diagnosis Date   • Hypothyroidism    • Obesity    • Palpitations 10/01/2020       Surgical History: has a past surgical history that includes  section ();  section (); and Cosmetic surgery ().     Family History: family history includes Anxiety disorder in her sister; Arthritis in her mother; Depression in her sister; Diabetes in her mother; No Known Problems in her brother.     Social History: reports that she has never smoked. She has never used smokeless tobacco. She reports that she does not drink alcohol and does not use drugs.    Allergies: Cephalexin and Adhesive tape    Current Outpatient Medications on File Prior to Visit   Medication Sig   •  FLUoxetine (PROzac) 20 MG capsule Take 1 capsule by mouth Daily.   •  levothyroxine (SYNTHROID, LEVOTHROID) 50 MCG tablet Take 1 tablet by mouth Daily for 90 days.   • metoprolol succinate XL (TOPROL-XL) 25 MG 24 hr tablet TAKE 1/2 TABLET BY MOUTH EVERY DAY         Review of Systems   Respiratory: Negative for cough, shortness of breath and wheezing.    Cardiovascular: Positive for  "palpitations. Negative for chest pain and leg swelling.   Gastrointestinal: Negative for nausea and vomiting.   Neurological: Negative for dizziness and syncope.        Objective     /79   Pulse 72   Ht 167.6 cm (66\")   Wt 93.3 kg (205 lb 9.6 oz)   BMI 33.18 kg/m²       Physical Exam    General : Alert, awake, no acute distress  Neck : Supple, no carotid bruit, no jugular venous distention  CVS : Regular rate and rhythm, no murmur, rubs or gallops  Lungs: Clear to auscultation bilaterally, no crackles or rhonchi  Abdomen: Soft, nontender, bowel sounds heard in all 4 quadrants  Extremities: Warm, well-perfused, no pedal edema    Result Review :     The following data was reviewed by: Andre Fowler MD on 12/06/2022:    CMP    CMP 1/17/22   Creatinine 0.80      Comments are available for some flowsheets but are not being displayed.             TSH    TSH 7/8/22   TSH 1.350                  Data reviewed: Cardiology studies         Echocardiogram from June, 2020 showed     1.  Preserved left ventricular systolic function with estimated LVEF of 55 to 60%.   2.  Normal diastolic function of the left ventricle.  3.  Mild mitral regurgitation.                 Assessment and Plan        Diagnoses and all orders for this visit:    1. Frequent PVCs (Primary)  Assessment & Plan:  Symptoms are reasonably well controlled.  No significant ectopy burden on auscultation today.  Previous echocardiogram showed normal LV function and no significant valvular abnormalities.  CT coronary angiogram showed normal coronary arteries as well.  Recommend to continue Toprol-XL 25 mg  half tablet once daily.              Follow Up     Return in about 1 year (around 12/6/2023) for Next scheduled follow up.    Patient was given instructions and counseling regarding her condition or for health maintenance advice. Please see specific information pulled into the AVS if appropriate.       "

## 2022-12-09 ENCOUNTER — PATIENT ROUNDING (BHMG ONLY) (OUTPATIENT)
Dept: FAMILY MEDICINE CLINIC | Age: 42
End: 2022-12-09

## 2022-12-09 NOTE — PROGRESS NOTES
December 9, 2022    Hello, may I speak with April Hawkins?    My name is AJ      I am  with [facility]  [address]  [place] [address] [phone].    Before we get started may I verify your date of birth? 1980    I am calling to officially welcome you to our practice and ask about your recent visit. Is this a good time to talk? YES    Tell me about your visit with us. What things went well?  I used to see Catalino Mccoy, so i've been here before. Sejal Coburn was good, she was very thorough.       We're always looking for ways to make our patients' experiences even better. Do you have recommendations on ways we may improve?  NO    Overall were you satisfied with your first visit to our practice? YES       I appreciate you taking the time to speak with me today. Is there anything else I can do for you? NO      Thank you, and have a great day.

## 2023-01-17 ENCOUNTER — TELEPHONE (OUTPATIENT)
Dept: FAMILY MEDICINE CLINIC | Age: 43
End: 2023-01-17
Payer: COMMERCIAL

## 2023-02-06 ENCOUNTER — LAB (OUTPATIENT)
Dept: LAB | Facility: HOSPITAL | Age: 43
End: 2023-02-06
Payer: COMMERCIAL

## 2023-02-06 DIAGNOSIS — E03.9 HYPOTHYROIDISM, UNSPECIFIED TYPE: ICD-10-CM

## 2023-02-06 PROCEDURE — 84443 ASSAY THYROID STIM HORMONE: CPT

## 2023-02-06 PROCEDURE — 36415 COLL VENOUS BLD VENIPUNCTURE: CPT

## 2023-02-07 LAB — TSH SERPL DL<=0.05 MIU/L-ACNC: 2.03 UIU/ML (ref 0.27–4.2)

## 2023-02-07 NOTE — PROGRESS NOTES
TSH previous was 1.3 , now its 2.0 , which is still in a good range. No change to medication, does she need refills sent ?

## 2023-05-31 ENCOUNTER — DOCUMENTATION (OUTPATIENT)
Dept: FAMILY MEDICINE CLINIC | Age: 43
End: 2023-05-31

## 2023-05-31 DIAGNOSIS — F43.23 ADJUSTMENT REACTION WITH ANXIETY AND DEPRESSION: Primary | ICD-10-CM

## 2023-05-31 RX ORDER — FLUOXETINE HYDROCHLORIDE 20 MG/1
20 CAPSULE ORAL DAILY
Qty: 90 CAPSULE | Refills: 3 | Status: SHIPPED | OUTPATIENT
Start: 2023-05-31

## 2023-06-14 ENCOUNTER — OFFICE VISIT (OUTPATIENT)
Dept: FAMILY MEDICINE CLINIC | Age: 43
End: 2023-06-14
Payer: COMMERCIAL

## 2023-06-14 VITALS
DIASTOLIC BLOOD PRESSURE: 77 MMHG | HEART RATE: 94 BPM | HEIGHT: 66 IN | OXYGEN SATURATION: 99 % | TEMPERATURE: 97.8 F | WEIGHT: 211.2 LBS | BODY MASS INDEX: 33.94 KG/M2 | SYSTOLIC BLOOD PRESSURE: 128 MMHG

## 2023-06-14 DIAGNOSIS — E03.9 HYPOTHYROIDISM, UNSPECIFIED TYPE: Chronic | ICD-10-CM

## 2023-06-14 DIAGNOSIS — F43.23 ADJUSTMENT REACTION WITH ANXIETY AND DEPRESSION: ICD-10-CM

## 2023-06-14 PROBLEM — R00.2 PALPITATIONS: Chronic | Status: RESOLVED | Noted: 2020-10-01 | Resolved: 2023-06-14

## 2023-06-14 PROCEDURE — 99213 OFFICE O/P EST LOW 20 MIN: CPT | Performed by: NURSE PRACTITIONER

## 2023-06-14 RX ORDER — FLUOXETINE HYDROCHLORIDE 20 MG/1
20 CAPSULE ORAL DAILY
Qty: 90 CAPSULE | Refills: 3 | Status: SHIPPED | OUTPATIENT
Start: 2023-06-14

## 2023-06-14 RX ORDER — LEVOTHYROXINE SODIUM 0.05 MG/1
50 TABLET ORAL DAILY
Qty: 90 TABLET | Refills: 1 | Status: SHIPPED | OUTPATIENT
Start: 2023-06-14

## 2023-06-16 NOTE — PROGRESS NOTES
Chief Complaint  April Hawkins presents to [facility] for Anxiety (6 mo. F/U ), Depression, and Palpitations      Subjective     History of Present Illness  April presents today for follow up on anxiety/depression.    She reports they are doing well on current treatment of   Prozac   April has been on this medication at the current dose for  years and feels it is working well for her.    April presents for follow up on hypothyroidism. Denies symptoms of thyroid dysfunction at this time.    Reports daily compliance with levothyroxine (Synthroid)50 mcg.  Denies any missed doses.    Last Lab Results       Component                Value               Date                       TSH                      2.030               02/06/2023                  Assessment and Plan       Diagnoses and all orders for this visit:    1. Adjustment reaction with anxiety and depression  Comments:  stable on current treatment  no concerns at this time, continue current dose   Orders:  -     FLUoxetine (PROzac) 20 MG capsule; Take 1 capsule by mouth Daily.  Dispense: 90 capsule; Refill: 3    2. Hypothyroidism, unspecified type  Comments:  stable on current medication - labs at next visit    Orders:  -     levothyroxine (SYNTHROID, LEVOTHROID) 50 MCG tablet; Take 1 tablet by mouth Daily.  Dispense: 90 tablet; Refill: 1        Follow Up   Return in about 6 months (around 12/14/2023) for Annual physical.      New Medications Ordered This Visit   Medications    FLUoxetine (PROzac) 20 MG capsule     Sig: Take 1 capsule by mouth Daily.     Dispense:  90 capsule     Refill:  3    levothyroxine (SYNTHROID, LEVOTHROID) 50 MCG tablet     Sig: Take 1 tablet by mouth Daily.     Dispense:  90 tablet     Refill:  1       Medications Discontinued During This Encounter   Medication Reason    metoprolol succinate XL (TOPROL-XL) 25 MG 24 hr tablet Non-compliance    levothyroxine (SYNTHROID, LEVOTHROID) 50 MCG tablet Reorder     "FLUoxetine (PROzac) 20 MG capsule Reorder            Review of Systems    Objective     Vitals:    23 1644   BP: 128/77   BP Location: Right arm   Patient Position: Sitting   Cuff Size: Adult   Pulse: 94   Temp: 97.8 °F (36.6 °C)   TempSrc: Oral   SpO2: 99%   Weight: 95.8 kg (211 lb 3.2 oz)   Height: 167.6 cm (66\")     Body mass index is 34.09 kg/m².     Physical Exam       Result Review                       Allergies   Allergen Reactions    Cephalexin Anaphylaxis    Adhesive Tape Dermatitis     dermabond      Past Medical History:   Diagnosis Date    Anxiety     Hypothyroidism     Obesity     Palpitations 10/01/2020     Current Outpatient Medications   Medication Sig Dispense Refill    FLUoxetine (PROzac) 20 MG capsule Take 1 capsule by mouth Daily. 90 capsule 3    levothyroxine (SYNTHROID, LEVOTHROID) 50 MCG tablet Take 1 tablet by mouth Daily. 90 tablet 1     No current facility-administered medications for this visit.     Past Surgical History:   Procedure Laterality Date     SECTION  2000     SECTION      COSMETIC SURGERY  2016      Health Maintenance Due   Topic Date Due    TDAP/TD VACCINES (1 - Tdap) Never done    HEPATITIS C SCREENING  Never done    ANNUAL PHYSICAL  10/01/2022    MAMMOGRAM  2023      Immunization History   Administered Date(s) Administered    COVID-19 (PFIZER) Purple Cap Monovalent 2021, 2021         Part of this note may be an electronic transcription/translation of spoken language to printed   text using the Dragon Dictation System.      CARMELA Gaytan  "

## 2023-11-16 ENCOUNTER — TELEPHONE (OUTPATIENT)
Dept: FAMILY MEDICINE CLINIC | Age: 43
End: 2023-11-16
Payer: COMMERCIAL

## 2023-11-16 DIAGNOSIS — E03.9 HYPOTHYROIDISM, UNSPECIFIED TYPE: Chronic | ICD-10-CM

## 2023-11-16 RX ORDER — LEVOTHYROXINE SODIUM 0.05 MG/1
50 TABLET ORAL DAILY
Qty: 90 TABLET | Refills: 1 | OUTPATIENT
Start: 2023-11-16

## 2023-11-16 NOTE — TELEPHONE ENCOUNTER
Caller: April Hawkins    Relationship: Self    Best call back number: [phone]     Requested Prescriptions:   Requested Prescriptions     Pending Prescriptions Disp Refills    levothyroxine (SYNTHROID, LEVOTHROID) 50 MCG tablet 90 tablet 1     Sig: Take 1 tablet by mouth Daily.        Pharmacy where request should be sent: [facility] - [facility] - [address] AT [address] TO [address] - [phone]  - [phone] FX     Last office visit with prescribing clinician: 6/14/2023   Last telemedicine visit with prescribing clinician: Visit date not found   Next office visit with prescribing clinician: 12/13/2023     Additional details provided by patient:     Does the patient have less than a 3 day supply:  [x] Yes  [] No    Would you like a call back once the refill request has been completed: [] Yes [x] No    If the office needs to give you a call back, can they leave a voicemail: [] Yes [x] No    Ava Powell Rep   11/16/23 08:36 EST

## 2023-11-18 DIAGNOSIS — E03.9 HYPOTHYROIDISM, UNSPECIFIED TYPE: Chronic | ICD-10-CM

## 2023-11-21 RX ORDER — LEVOTHYROXINE SODIUM 50 MCG
50 TABLET ORAL DAILY
Qty: 90 TABLET | Refills: 0 | Status: SHIPPED | OUTPATIENT
Start: 2023-11-21

## 2024-10-10 ENCOUNTER — OFFICE VISIT (OUTPATIENT)
Dept: FAMILY MEDICINE CLINIC | Age: 44
End: 2024-10-10
Payer: COMMERCIAL

## 2024-10-10 VITALS
WEIGHT: 212 LBS | OXYGEN SATURATION: 98 % | SYSTOLIC BLOOD PRESSURE: 141 MMHG | DIASTOLIC BLOOD PRESSURE: 87 MMHG | HEIGHT: 66 IN | BODY MASS INDEX: 34.07 KG/M2 | HEART RATE: 70 BPM

## 2024-10-10 DIAGNOSIS — Z13.220 SCREENING, LIPID: ICD-10-CM

## 2024-10-10 DIAGNOSIS — I49.9 IRREGULAR HEART BEAT: ICD-10-CM

## 2024-10-10 DIAGNOSIS — E53.8 B12 DEFICIENCY: ICD-10-CM

## 2024-10-10 DIAGNOSIS — E55.9 VITAMIN D DEFICIENCY: ICD-10-CM

## 2024-10-10 DIAGNOSIS — Z00.00 ANNUAL PHYSICAL EXAM: Primary | ICD-10-CM

## 2024-10-10 DIAGNOSIS — E03.9 HYPOTHYROIDISM, UNSPECIFIED TYPE: ICD-10-CM

## 2024-10-10 DIAGNOSIS — F43.23 ADJUSTMENT REACTION WITH ANXIETY AND DEPRESSION: ICD-10-CM

## 2024-10-10 DIAGNOSIS — N95.1 PERIMENOPAUSAL VASOMOTOR SYMPTOMS: ICD-10-CM

## 2024-10-10 DIAGNOSIS — Z11.59 NEED FOR HEPATITIS C SCREENING TEST: ICD-10-CM

## 2024-10-10 DIAGNOSIS — Z12.11 SCREENING FOR COLON CANCER: ICD-10-CM

## 2024-10-10 RX ORDER — LEVOTHYROXINE SODIUM 50 UG/1
50 TABLET ORAL DAILY
COMMUNITY
End: 2024-10-10 | Stop reason: SDUPTHER

## 2024-10-10 NOTE — PROGRESS NOTES
"Chief Complaint  Establish Care and Anxiety    Subjective        April Hawkins presents to [facility] today for to establish care with new primary care provider.  Has seen a provider in the past we will to transfer back to her.  Having issues with anxiety and depression, may be seasonal affective disorder.  Son moved to [place], which is about 30-hour drive from here, and had some personal issues going on in the community where she lives, also had a change in job, due to some personality conflict at all of the office, all of these things combined has taken a toll on her lately.    Does have a history of palpitations, was seeing Dr. Dr. Fowler and had a full cardiac workup, had been taking metoprolol but gradually weaned herself off of this almost a year ago, and has not needed it.  Today she does feel some palpitations but feels this may be related to anxiety.      Current Outpatient Medications:     levothyroxine (Synthroid) 50 MCG tablet, TAKE 1 TABLET DAILY, Disp: 90 tablet, Rfl: 0    FLUoxetine (PROzac) 20 MG capsule, Take 1 capsule by mouth Daily., Disp: , Rfl:   Medications Discontinued During This Encounter   Medication Reason    FLUoxetine (PROzac) 20 MG capsule Duplicate order    levothyroxine (Synthroid) 50 MCG tablet Duplicate order         Allergies:  Cephalexin and Adhesive tape      Objective   Vital Signs:   Vitals:    10/10/24 1624   BP: 141/87   BP Location: Right arm   Patient Position: Sitting   Pulse: 70   SpO2: 98%  Comment: room air   Weight: 96.2 kg (212 lb)   Height: 167.6 cm (66\")     Body mass index is 34.22 kg/m².  BMI is >= 30 and <35. (Class 1 Obesity). The following options were offered after discussion;: weight loss educational material (shared in after visit summary), exercise counseling/recommendations, and nutrition counseling/recommendations        Physical Exam  Constitutional:       Appearance: Normal appearance.   Neck:      Vascular: No carotid " bruit.   Cardiovascular:      Rate and Rhythm: Normal rate. Rhythm irregular.      Heart sounds: Normal heart sounds.   Pulmonary:      Effort: Pulmonary effort is normal.      Breath sounds: Normal breath sounds.   Musculoskeletal:         General: Normal range of motion.   Skin:     General: Skin is warm and dry.   Neurological:      General: No focal deficit present.      Mental Status: She is alert.   Psychiatric:         Mood and Affect: Mood is anxious and depressed. Affect is tearful.         Behavior: Behavior normal.             Lab Results   Component Value Date    GLUCOSE 101 (H) 12/10/2021    BUN 13 12/10/2021    CREATININE 0.80 01/17/2022    EGFRIFNONA 85 12/10/2021    BCR 17.3 12/10/2021    K 4.9 12/10/2021    CO2 27.5 12/10/2021    CALCIUM 9.4 12/10/2021    ALBUMIN 4.30 08/11/2021    LABIL2 1.5 12/04/2019    AST 12 08/11/2021    ALT 11 08/11/2021       Lab Results   Component Value Date    CHOL 180 08/11/2021    TRIG 65 08/11/2021    HDL 64 (H) 08/11/2021     (H) 08/11/2021       Lab Results   Component Value Date    WBC 10.00 08/11/2021    HGB 14.0 08/11/2021    HCT 42.9 08/11/2021    MCV 89.9 08/11/2021     08/11/2021             ECG 12 Lead    Date/Time: 10/10/2024 6:19 PM  Performed by: Lolis Mccoy APRN    Authorized by: Lolis Mccoy APRN  Comparison: compared with previous ECG from 1/21/2020  Comparison to previous ECG: Sinus rhythm  Rhythm: sinus rhythm  Ectopy: bigeminy  Rate: normal  Conduction: conduction normal  ST Segments: ST segments normal  T Waves: T waves normal  QRS axis: normal  Comments: Sinus rhythm with ventricular bigeminy               Diagnoses and all orders for this visit:    1. Annual physical exam (Primary)    2. Hypothyroidism, unspecified type  -     TSH Rfx On Abnormal To Free T4; Future    3. Irregular heart beat  -     ECG 12 Lead  -     Ambulatory Referral to Cardiology    4. Adjustment reaction with anxiety and depression  -      Ambulatory Referral to Psychiatry    5. Need for hepatitis C screening test  -     Hepatitis C Antibody; Future    6. Perimenopausal vasomotor symptoms  -     Progesterone; Future  -     Estradiol; Future  -     Estrogens, Total; Future  -     Testosterone, Free, Total; Future  -     Comprehensive Metabolic Panel; Future  -     CBC & Differential; Future    7. Screening, lipid  -     Lipid Panel; Future    8. Vitamin D deficiency  -     Vitamin D,25-Hydroxy; Future    9. B12 deficiency  -     Vitamin B12 & Folate; Future    10. Screening for colon cancer  -     Cologuard - Stool, Per Rectum; Future            Follow Up  Return for will call with lab results.  Patient was given instructions and counseling regarding her condition or for health maintenance advice. Please see specific information pulled into the AVS if appropriate.           Catalino Mccoy, APRN  10/10/2024    Please note that portions of this document were completed using a voice recognition program.

## 2024-10-10 NOTE — Clinical Note
Get Mammogram from [facility] from this year. And last OV from [facility] [place]

## 2024-10-11 ENCOUNTER — TELEPHONE (OUTPATIENT)
Dept: FAMILY MEDICINE CLINIC | Age: 44
End: 2024-10-11
Payer: COMMERCIAL

## 2024-10-11 NOTE — TELEPHONE ENCOUNTER
----- Message from Lolis Mccoy sent at 10/10/2024  5:28 PM EDT -----  Get Mammogram from [facility] from this year. And last OV from [facility] [place]

## 2024-10-15 ENCOUNTER — LAB (OUTPATIENT)
Dept: LAB | Facility: HOSPITAL | Age: 44
End: 2024-10-15
Payer: COMMERCIAL

## 2024-10-15 DIAGNOSIS — E53.8 B12 DEFICIENCY: ICD-10-CM

## 2024-10-15 DIAGNOSIS — E55.9 VITAMIN D DEFICIENCY: ICD-10-CM

## 2024-10-15 DIAGNOSIS — Z13.220 SCREENING, LIPID: ICD-10-CM

## 2024-10-15 DIAGNOSIS — Z11.59 NEED FOR HEPATITIS C SCREENING TEST: ICD-10-CM

## 2024-10-15 DIAGNOSIS — N95.1 PERIMENOPAUSAL VASOMOTOR SYMPTOMS: ICD-10-CM

## 2024-10-15 DIAGNOSIS — E03.9 HYPOTHYROIDISM, UNSPECIFIED TYPE: ICD-10-CM

## 2024-10-15 LAB
25(OH)D3 SERPL-MCNC: 22.5 NG/ML (ref 30–100)
ALBUMIN SERPL-MCNC: 3.8 G/DL (ref 3.5–5.2)
ALBUMIN/GLOB SERPL: 1.4 G/DL
ALP SERPL-CCNC: 99 U/L (ref 39–117)
ALT SERPL W P-5'-P-CCNC: 8 U/L (ref 1–33)
ANION GAP SERPL CALCULATED.3IONS-SCNC: 6.3 MMOL/L (ref 5–15)
AST SERPL-CCNC: 9 U/L (ref 1–32)
BASOPHILS # BLD AUTO: 0.04 10*3/MM3 (ref 0–0.2)
BASOPHILS NFR BLD AUTO: 0.5 % (ref 0–1.5)
BILIRUB SERPL-MCNC: 0.3 MG/DL (ref 0–1.2)
BUN SERPL-MCNC: 10 MG/DL (ref 6–20)
BUN/CREAT SERPL: 10.8 (ref 7–25)
CALCIUM SPEC-SCNC: 9.1 MG/DL (ref 8.6–10.5)
CHLORIDE SERPL-SCNC: 104 MMOL/L (ref 98–107)
CHOLEST SERPL-MCNC: 185 MG/DL (ref 0–200)
CO2 SERPL-SCNC: 26.7 MMOL/L (ref 22–29)
CREAT SERPL-MCNC: 0.93 MG/DL (ref 0.57–1)
DEPRECATED RDW RBC AUTO: 44.5 FL (ref 37–54)
EGFRCR SERPLBLD CKD-EPI 2021: 77.4 ML/MIN/1.73
EOSINOPHIL # BLD AUTO: 0.08 10*3/MM3 (ref 0–0.4)
EOSINOPHIL NFR BLD AUTO: 1 % (ref 0.3–6.2)
ERYTHROCYTE [DISTWIDTH] IN BLOOD BY AUTOMATED COUNT: 13 % (ref 12.3–15.4)
ESTRADIOL SERPL HS-MCNC: 76.2 PG/ML
FOLATE SERPL-MCNC: 7.61 NG/ML (ref 4.78–24.2)
GLOBULIN UR ELPH-MCNC: 2.7 GM/DL
GLUCOSE SERPL-MCNC: 96 MG/DL (ref 65–99)
HCT VFR BLD AUTO: 42.3 % (ref 34–46.6)
HCV AB SER QL: NORMAL
HDLC SERPL-MCNC: 45 MG/DL (ref 40–60)
HGB BLD-MCNC: 13.3 G/DL (ref 12–15.9)
IMM GRANULOCYTES # BLD AUTO: 0.01 10*3/MM3 (ref 0–0.05)
IMM GRANULOCYTES NFR BLD AUTO: 0.1 % (ref 0–0.5)
LDLC SERPL CALC-MCNC: 119 MG/DL (ref 0–100)
LDLC/HDLC SERPL: 2.59 {RATIO}
LYMPHOCYTES # BLD AUTO: 1.79 10*3/MM3 (ref 0.7–3.1)
LYMPHOCYTES NFR BLD AUTO: 23.2 % (ref 19.6–45.3)
MCH RBC QN AUTO: 29 PG (ref 26.6–33)
MCHC RBC AUTO-ENTMCNC: 31.4 G/DL (ref 31.5–35.7)
MCV RBC AUTO: 92.2 FL (ref 79–97)
MONOCYTES # BLD AUTO: 0.43 10*3/MM3 (ref 0.1–0.9)
MONOCYTES NFR BLD AUTO: 5.6 % (ref 5–12)
NEUTROPHILS NFR BLD AUTO: 5.36 10*3/MM3 (ref 1.7–7)
NEUTROPHILS NFR BLD AUTO: 69.6 % (ref 42.7–76)
PLATELET # BLD AUTO: 257 10*3/MM3 (ref 140–450)
PMV BLD AUTO: 10.4 FL (ref 6–12)
POTASSIUM SERPL-SCNC: 4.3 MMOL/L (ref 3.5–5.2)
PROGEST SERPL-MCNC: 2.16 NG/ML
PROT SERPL-MCNC: 6.5 G/DL (ref 6–8.5)
RBC # BLD AUTO: 4.59 10*6/MM3 (ref 3.77–5.28)
SODIUM SERPL-SCNC: 137 MMOL/L (ref 136–145)
TRIGL SERPL-MCNC: 117 MG/DL (ref 0–150)
TSH SERPL DL<=0.05 MIU/L-ACNC: 2.06 UIU/ML (ref 0.27–4.2)
VIT B12 BLD-MCNC: 930 PG/ML (ref 211–946)
VLDLC SERPL-MCNC: 21 MG/DL (ref 5–40)
WBC NRBC COR # BLD AUTO: 7.71 10*3/MM3 (ref 3.4–10.8)

## 2024-10-15 PROCEDURE — 82672 ASSAY OF ESTROGEN: CPT

## 2024-10-15 PROCEDURE — 84403 ASSAY OF TOTAL TESTOSTERONE: CPT

## 2024-10-15 PROCEDURE — 36415 COLL VENOUS BLD VENIPUNCTURE: CPT

## 2024-10-15 PROCEDURE — 82607 VITAMIN B-12: CPT

## 2024-10-15 PROCEDURE — 80061 LIPID PANEL: CPT

## 2024-10-15 PROCEDURE — 86803 HEPATITIS C AB TEST: CPT

## 2024-10-15 PROCEDURE — 84402 ASSAY OF FREE TESTOSTERONE: CPT

## 2024-10-15 PROCEDURE — 82746 ASSAY OF FOLIC ACID SERUM: CPT

## 2024-10-15 PROCEDURE — 82670 ASSAY OF TOTAL ESTRADIOL: CPT

## 2024-10-15 PROCEDURE — 80050 GENERAL HEALTH PANEL: CPT

## 2024-10-15 PROCEDURE — 84144 ASSAY OF PROGESTERONE: CPT

## 2024-10-15 PROCEDURE — 82306 VITAMIN D 25 HYDROXY: CPT

## 2024-10-16 DIAGNOSIS — E78.00 HIGH CHOLESTEROL: Primary | ICD-10-CM

## 2024-10-16 NOTE — PROGRESS NOTES
Vitamin D is slightly low at 22.5 needs to be taken 5000 units over-the-counter daily, lipid panel is normal except for mildly elevated LDL, low-cholesterol diet recommended repeat fasting lipid in 3 months.  Estrogen is normal, TSH is normal continue same dose thyroid medication, B12 is normal.  CMP is normal, awaiting the rest of her labs.

## 2024-10-20 LAB — ESTROGEN SERPL-MCNC: 186 PG/ML

## 2024-10-21 LAB
TESTOST FREE SERPL-MCNC: <0.2 PG/ML (ref 0–4.2)
TESTOST SERPL-MCNC: 3 NG/DL (ref 4–50)

## 2024-10-21 NOTE — PROGRESS NOTES
Estrogen is normal, on the lower side of normal.  She is not menopausal yet but could be starting into premenopausal stage.

## 2024-10-28 ENCOUNTER — OFFICE VISIT (OUTPATIENT)
Dept: CARDIOLOGY | Facility: CLINIC | Age: 44
End: 2024-10-28
Payer: COMMERCIAL

## 2024-10-28 VITALS
OXYGEN SATURATION: 98 % | SYSTOLIC BLOOD PRESSURE: 126 MMHG | WEIGHT: 277 LBS | BODY MASS INDEX: 44.52 KG/M2 | HEIGHT: 66 IN | DIASTOLIC BLOOD PRESSURE: 77 MMHG

## 2024-10-28 DIAGNOSIS — F41.9 ANXIETY: ICD-10-CM

## 2024-10-28 DIAGNOSIS — I49.3 FREQUENT PVCS: Primary | ICD-10-CM

## 2024-10-28 NOTE — PROGRESS NOTES
Date of Office Visit: 10/28/24  Encounter Provider: Timothy Ordoñez MD  Place of Service: [facility] CARDIOLOGY  Patient Name: April Hawkins  :1980  [id number]    Chief Complaint   Patient presents with    Rapid Heart Rate        HPI: April Hawkins is a 44 y.o. female she comes as a new consult to see us about palpitations and chest pressure.  This is a relatively young woman previously healthy but about 4 years ago she started noticing when she would get anxious that she developed discomfort or discomfort in her chest and substernal and palpitations and once the anxiety calm down the palpitations went away no PND no orthopnea no edema she does not smoke rarely drinks no history of hypertension diabetes or hyperlipidemia.  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  It seems like the anxiety comes on first and then the chest discomfort and the palpitations.    She had seen another cardiologist she did a thorough evaluation normal echo had a normal cardiac CT angiogram calcium score 0 coronaries look normal structurally the heart was normal and a Holter that showed less than 1% of the time PVCs.  She otherwise has been healthy no syncope she gets very emotional during the exam today crying    Past Medical History:   Diagnosis Date    Anxiety     Hypothyroidism     Obesity     Palpitations 10/01/2020       Past Surgical History:   Procedure Laterality Date     SECTION  2000     SECTION  2002    COSMETIC SURGERY  2016       Social History     Socioeconomic History    Marital status:     Number of children: 2   Tobacco Use    Smoking status: Never    Smokeless tobacco: Never   Vaping Use    Vaping status: Never Used   Substance and Sexual Activity    Alcohol use: Never    Drug use: Never    Sexual  "activity: Yes     Partners: Male     Birth control/protection: Other     Comment:  with vasectomy        Family History   Problem Relation Age of Onset    Arthritis Mother     Diabetes Mother     Anxiety disorder Sister     Depression Sister     No Known Problems Brother        Review of Systems   Constitutional: Negative for decreased appetite, fever, malaise/fatigue and weight loss.   HENT:  Negative for nosebleeds.    Eyes:  Negative for double vision.   Cardiovascular:  Negative for chest pain, claudication, cyanosis, dyspnea on exertion, irregular heartbeat, leg swelling, near-syncope, orthopnea, palpitations, paroxysmal nocturnal dyspnea and syncope.   Respiratory:  Negative for cough, hemoptysis and shortness of breath.    Hematologic/Lymphatic: Negative for bleeding problem.   Skin:  Negative for rash.   Musculoskeletal:  Negative for falls and myalgias.   Gastrointestinal:  Negative for hematochezia, jaundice, melena, nausea and vomiting.   Genitourinary:  Negative for hematuria.   Neurological:  Negative for dizziness and seizures.   Psychiatric/Behavioral:  Negative for altered mental status and memory loss.        Allergies   Allergen Reactions    Cephalexin Anaphylaxis    Adhesive Tape Dermatitis     dermabond         Current Outpatient Medications:     FLUoxetine (PROzac) 20 MG capsule, Take 1 capsule by mouth Daily., Disp: , Rfl:     levothyroxine (Synthroid) 50 MCG tablet, TAKE 1 TABLET DAILY, Disp: 90 tablet, Rfl: 0      Objective:     Vitals:    10/28/24 1122   BP: 126/77   BP Location: Left arm   Patient Position: Sitting   Cuff Size: Adult   SpO2: 98%   Weight: 126 kg (277 lb)   Height: 167.6 cm (66\")     Body mass index is 44.71 kg/m².    Constitutional:       Appearance: Well-developed.   Eyes:      General: No scleral icterus.  HENT:      Head: Normocephalic.   Neck:      Thyroid: No thyromegaly.      Vascular: No JVD.      Lymphadenopathy: No cervical adenopathy.   Pulmonary:      " Effort: Pulmonary effort is normal.      Breath sounds: Normal breath sounds. No wheezing. No rales.   Cardiovascular:      Normal rate. Regular rhythm.      No gallop.    Edema:     Peripheral edema absent.   Abdominal:      Palpations: Abdomen is soft.      Tenderness: There is no abdominal tenderness.   Musculoskeletal: Normal range of motion. Skin:     General: Skin is warm and dry.      Findings: No rash.   Neurological:      Mental Status: Alert and oriented to person, place, and time.           ECG 12 Lead    Date/Time: 10/28/2024 1:07 PM  Performed by: Timothy Ordoñez MD    Authorized by: Timothy Ordoñez MD  Comparison: compared with previous ECG   Rhythm: sinus rhythm    Clinical impression: normal ECG           Assessment:       Diagnosis Plan   1. Frequent PVCs        2. Anxiety               Plan:       I actually think that her cardiac function is normal she does have occasional PVCs and there is an ECG that her primary care physician did that showed 2 PVCs they are unifocal.  But her otherwise structural integrity of her heart is normal she does not have any coronary risks really and I feel like this is mainly driven from her anxiety and that we need to try and get at the root of that and that she needs to work on that and she is going to I would not treat her with anything right now and I do not think we need any further evaluation I will just have her come back and see us as needed    No follow-ups on file.     As always, it has been a pleasure to participate in your patient's care.      Sincerely,       Timothy Ordoñez MD

## 2024-11-04 DIAGNOSIS — E03.9 HYPOTHYROIDISM, UNSPECIFIED TYPE: Chronic | ICD-10-CM

## 2024-11-04 DIAGNOSIS — F43.23 ADJUSTMENT REACTION WITH ANXIETY AND DEPRESSION: Primary | ICD-10-CM

## 2024-11-04 RX ORDER — LEVOTHYROXINE SODIUM 50 UG/1
50 TABLET ORAL DAILY
Qty: 90 TABLET | Refills: 3 | Status: SHIPPED | OUTPATIENT
Start: 2024-11-04

## 2025-01-02 ENCOUNTER — OFFICE VISIT (OUTPATIENT)
Dept: BEHAVIORAL HEALTH | Facility: CLINIC | Age: 45
End: 2025-01-02
Payer: COMMERCIAL

## 2025-01-02 VITALS
BODY MASS INDEX: 35.2 KG/M2 | WEIGHT: 219 LBS | DIASTOLIC BLOOD PRESSURE: 82 MMHG | HEART RATE: 110 BPM | SYSTOLIC BLOOD PRESSURE: 102 MMHG | HEIGHT: 66 IN

## 2025-01-02 DIAGNOSIS — Z86.39 HISTORY OF HYPOTHYROIDISM: ICD-10-CM

## 2025-01-02 DIAGNOSIS — R68.89 DIFFICULTY MAINTAINING WEIGHT LOSS: ICD-10-CM

## 2025-01-02 DIAGNOSIS — Z79.899 MEDICATION MANAGEMENT: ICD-10-CM

## 2025-01-02 DIAGNOSIS — F41.1 GENERALIZED ANXIETY DISORDER: ICD-10-CM

## 2025-01-02 DIAGNOSIS — Z71.89 MEDICATION CARE PLAN DISCUSSED WITH PATIENT: ICD-10-CM

## 2025-01-02 DIAGNOSIS — F40.10 SOCIAL ANXIETY DISORDER: ICD-10-CM

## 2025-01-02 DIAGNOSIS — R63.5 MULTIFACTORIAL WEIGHT GAIN: ICD-10-CM

## 2025-01-02 DIAGNOSIS — F33.1 MODERATE EPISODE OF RECURRENT MAJOR DEPRESSIVE DISORDER: Primary | ICD-10-CM

## 2025-01-02 DIAGNOSIS — F45.22 BODY IMAGE DISTURBANCE: ICD-10-CM

## 2025-01-02 NOTE — PATIENT INSTRUCTIONS
"1. Should you want to get in touch with your provider, CARMELA Esquivel, please contact [facility] Medical [facility], Tri, directly at [phone].  Recommend saving Tri's direct number in phone as this is the PREFERRED & EASIEST way to get in contact with your provider.  Please leave a voice mail if you do not get an answer and she will return your call within 24 hrs. You will NOT be able to contact provider on [employer], as Behavioral Health Providers are restricted. YOU MUST CALL [phone]  If you need to speak with the on call provider after hours or on weekends, please Contact the [facility] ([phone]) and staff will be able to page the provider on call directly.     2.  In the event you need to cancel an appointment, please notify the office at least 24 hrs prior:   Contact **[facility] Assistant at [facility] directly at [phone] or the [facility] ([phone])     3. MEDICATION REFILLS:  PLEASE CALL THE PHARMACY TO REQUEST ALL MEDICATION REFILLS or via [employer] TO ENSURE YOU ARE RECEIVING YOUR MEDICATIONS IN A TIMELY MANNER. The pharmacy or [employer] [facility] will send this request ELECTRONICALLY to the ordering provider.   IF YOU USE AN AUTOMATED SERVICE AT THE PHARMACY FOR REFILLS AND ARE TOLD THERE ARE \"NO REFILLS REMAINING\"   PLEASE CALL THE PHARMACY & SPEAK TO A LIVE PERSON TO VERIFY IT IS THE MOST UP TO DATE PRESCRIPTION ON FILE.    All new prescriptions will have a different number, therefore, if you were given refills for a medication today or at last visit it will not have the same number as the previous prescription.     Going forward any medications for your mental health including any previously prescribed by your primary care provider will now be managed by CARMELA Esquivel. Contact provider's MA INITIALLY when down to 5-7 days remaining to ensure new refill(s) will have this provider name on prescription for future refills.  If requested from current bottle, via " "mychart, or via pharmacy the request would be directed to that ordering provider. And to prevent confusion, inaccurate dosing, inaccurate medication refills, the management of psychotropic and/or mental health medications should only be ordered by this mental health provider.    4.  In the event you have personal crisis, contact the following crisis numbers: Suicide Prevention Hotline 1-751.447.3884 or *988, [facility] Helpline 4-935-024-YARELIS; [facility] Emergency Room [phone]; text HELLO to 775919; or 911.  If you feel like harming yourself or others, call 911 right away.  You can call the [facility] Suicide and Crisis Lifeline at  988   to speak with a counselor at the lifeline, or you can connect with one using their online chat  .    5.  Never stop an antidepressant medicine without first talking to a healthcare provider. Suddenly stopping this type of medication can cause withdrawal symptoms.    6.  Counseling and talk therapy  Counseling or therapy teaches you new coping skills and more adaptive ways of thinking about problems. These tools can help you make positive changes. The benefits of counseling often last long after treatment sessions have stopped.    7.   We would appreciate your feedback, please scan the QRS code on the back of your appointment card (or see below) and complete a brief survey.  [place] location is still not available, so please click \"[place]\" location.  Thank you     8. NO SHOW POLICY/SAME DAY CANCEL POLICY:  We understand unexpected circumstances arise; however, anytime you miss your appointment we are unable to provide you appropriate care.  In addition, each appointment missed could have been used to provide care for others.  We ask that you call at least 24 hours in advance to cancel or reschedule an appointment. We would like to take this opportunity to remind you of our policy stating patients who miss THREE or more appointments without cancelling or rescheduling 24 " "hours in advance of the appointment may be subject to cancellation of any further visits with our clinic and recommendation to seek in-person services/visits. Please call [phone] to reschedule your appointment. If there are reasons that make it difficult for you to keep the appointments, please call and let us know how we can help. Please understand that medication prescribing will not continue without seeing your provider.        SPECIFIC RECOMMENDATIONS:     1.      Medications discussed at this encounter:                   -  INCREASE Prozac FROM 20 mg TO 40mg, start taking 2 of the 20 mg EVERY MORNING, DO NOT TAKE DOSE TONIGHT    2.      Psychotherapy recommendations. Declined     3.     Return to clinic: 7 weeks Friday 2/21/25 at 2pm     4.  Coping mechanisms discussed with patient today as a way to gain control over feelings to prevent situation or panic attack from getting worse: grounding techniques using 5 senses (name 3 things you can see, smell, touch, etc.), slow paced breathing techniques- focus on door inhaling and exhaling at each corner, application of cold compress/ice pack/water on face or opening freezer door to allow cold air to be breathed in taking slow deep breaths, closing eyes and focus on positive thoughts.     Please arrive at least 15 minutes before your scheduled appointment time to complete check in process.      IF you are scheduled for a [employer] V[facility] visit, YOU MUST COMPLETE THE \"E-CHECK IN\" PROCESS PRIOR TO BEGINNING THE VISIT, You may still complete the E-Check in for in office visits prior to appointment, you will receive multiple text/email reminders which will direct you further if needed.            "

## 2025-01-02 NOTE — PROGRESS NOTES
"Subjective   April Hawkins is a 44 y.o. female who presents today for initial evaluation     Referring Provider:  Lolis Mccoy, APRN  [address]  [place] [address]    Chief Complaint:  anxiety, depression, social anxiety, difficulty maintaining weight loss, body image disturbance, multifactorial weight gain, history of hypothyroidism, medication management, medication care plan discussed    Answers submitted by the patient for this visit:  Primary Reason for Visit (Submitted on 1/2/2025)  What is the primary reason for your visit?: Anxiety  Anxiety (Submitted on 1/2/2025)  Chief Complaint: Anxiety  Visit: initial  Onset: 1 to 5 years ago  Progression since onset: comes and goes  Frequency: most days  Severity: moderate  depressed mood: Yes  dry mouth: No  excessive worry: No  insomnia: Yes  irritability: No  malaise/fatigue: Yes  obsessions: No  Sleep quality: poor  Hours of sleep per night: 4 Hours  Aggravated by: family issues, social activities    Provider introduced self, as well as credentials, and informed of provider role which will primarily include medication management of mental health conditions. Explained the difference between provider role vs. therapy/counseling services which include CBT (talk therapy) and do not include management of medications which are typically 45 minutes to 1 hr in length, however, if patient was in agreement to start therapy this provider can provide a contact list and/or refer. Patient verbalized understanding and agreed to proceed.    History of Present Illness:  Patient presents today in office with a history of anxiety and depression for which treatment began in 2022.  Patient is currently prescribed Prozac 20 mg, which have provided some effectiveness.  Patient has a PMHX of hypothyroidism and PVC's (past cardiac work up's negative and noted occasional PVC's which were deemed related to anxiety).    Patient goal of treatment \"I am really not " "sure.\"    Patient reports she is doing better than she was in 10/2024 when she seen PCP.   Initially in 2020 had heart palpitations which were deemed related to thyroid though felt it was related to anxiety, and in 2022 started Prozac and felt it lost its effects, as symptoms returned and self stopped. And after 8 months felt she was doing fine without it, though in 10/2024 returned to PCP and restarted Prozac. Patient reports there were several \"little things\" worsening over time.  Son moved to [place] 1.5 yrs ago and does not feel that is a contributing factor in worsening mood.     Patient didn't want to leave the house for over 1 week, had opportunities to be seen by this provider earlier, though avoided, and arrived today to clinic and stayed in the parking lot for an hour before coming in today.  Has to prepare self mentally before leaving home, which is new.      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  \"I have put on so much weight over the last 4 yrs, I cannot seem to get control of it.\" Patient had been working at a [employer] office for 13 yrs up until this last year.  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     \"So many changes over 4 yrs\" has taken a toll on patient. 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     Patient has been taking Prozac at " "night due to taking thyroid medication in the morning. And has difficulty sleeping, last night slept from 10-12, then up and down throughout the night. Patient had never taken more than 20 mg in the past.     Patient reports she was over 300 lbs at age 19, after having 2nd child and lost over 150 lbs with weight watchers, patient has never tried any weight loss medications. And has been able to keep weight down for over 20 yrs, and when thyroid problems arose she has struggled with weight gain.   Weight was 187 lbs 1/21/2020, and stayed between 170-180 and was content.  Though despite efforts to exercise, dietary changes weight continues to increase. \"I don't do anything different.\"  Patient will no longer go tto the gym \"I feel so out of place.\" Patient used to work at a gym, due to weight gain patient voices feeling embarrassed about weight gain and will avoid going out of the house. Patient did take Phentermine 12/2023 for 2 days and did not do well, unable to recall specific details though felt it was not something she could tolerate everyday.    Weight 10/10/24: 212, entered weight on 10/28/24 of 277 was done in error based on review of weight trend. 6/14/, 12/2022 205 lbs, 12/2021 196, 5/2020 192, and 1/21/20 187 lbs.    Depression: Patient complains of depression. She complains of anhedonia, depressed mood, difficulty concentrating, fatigue, feelings of worthlessness/guilt, insomnia, and weight gain     Anxiety:  The patient endorses to the following symptoms of anxiety including:  anxiousness, being easily fatigued, difficulty concentrating or mind going blank, irritability, and sleep disturbance which have caused impairment in important areas of daily functioning.  The patient has had symptoms of anxiety for the last 4 yrs, which have worsened over the last several months.      PHQ-9 Depression Screening  PHQ-9 Total Score: (Patient-Rptd) 10   Little interest or pleasure in doing things? " (Patient-Rptd) Several days   Feeling down, depressed, or hopeless? (Patient-Rptd) Several days   PHQ-2 Total Score (Patient-Rptd) 2   Trouble falling or staying asleep, or sleeping too much? (Patient-Rptd) Almost all   Feeling tired or having little energy? (Patient-Rptd) Over half   Poor appetite or overeating? (Patient-Rptd) Several days   Feeling bad about yourself - or that you are a failure or have let yourself or your family down? (Patient-Rptd) Several days   Trouble concentrating on things, such as reading the newspaper or watching television? (Patient-Rptd) Several days   Moving or speaking so slowly that other people could have noticed? Or the opposite - being so fidgety or restless that you have been moving around a lot more than usual? (Patient-Rptd) Not at all   Thoughts that you would be better off dead, or of hurting yourself in some way? (Patient-Rptd) Not at all   PHQ-9 Total Score (Patient-Rptd) 10   If you checked off any problems, how difficult have these problems made it for you to do your work, take care of things at home, or get along with other people? (Patient-Rptd) Somewhat difficult         MICHELE-7  Feeling nervous, anxious or on edge: (Patient-Rptd) Several days  Not being able to stop or control worrying: (Patient-Rptd) More than half the days  Worrying too much about different things: (Patient-Rptd) Several days  Trouble Relaxing: (Patient-Rptd) Several days  Being so restless that it is hard to sit still: (Patient-Rptd) Not at all  Feeling afraid as if something awful might happen: (Patient-Rptd) Not at all  Becoming easily annoyed or irritable: (Patient-Rptd) Several days  MICHELE 7 Total Score: (Patient-Rptd) 6  If you checked any problems, how difficult have these problems made it for you to do your work, take care of things at home, or get along with other people: (Patient-Rptd) Somewhat difficult    The following portions of the patient's history were reviewed and updated as  appropriate: allergies, current medications, past family history, past medical history, past social history, and past surgical history.     Past Surgical History:  Past Surgical History:   Procedure Laterality Date     SECTION  2000     SECTION  2002    COSMETIC SURGERY  2016       Problem List:  Patient Active Problem List   Diagnosis    Hypothyroidism    Frequent PVCs    Anxiety       Allergy:   Allergies   Allergen Reactions    Cephalexin Anaphylaxis    Adhesive Tape Dermatitis     dermabond        Discontinued Medications:  Medications Discontinued During This Encounter   Medication Reason    FLUoxetine (PROzac) 20 MG capsule Dose adjustment    FLUoxetine (PROzac) 20 MG capsule *Re-Entry       Current Medications:   Current Outpatient Medications   Medication Sig Dispense Refill    FLUoxetine (PROzac) 20 MG capsule Take 2 capsules by mouth Every Morning. Indications: Generalized Anxiety Disorder, Major Depressive Disorder, Social Anxiety Disorder      levothyroxine (Synthroid) 50 MCG tablet Take 1 tablet by mouth Daily. 90 tablet 3     No current facility-administered medications for this visit.       Past Medical History:  Past Medical History:   Diagnosis Date    Anxiety     Hypothyroidism     Obesity     Palpitations 10/01/2020       Past Psychiatric History:  Began Treatment:   Diagnoses:Anxiety  Psychiatrist:Denies  Therapist:Denies  Admission History:Denies  Medication Trials: denies    Self Harm: Denies  Suicide Attempts:Denies   Psychosis, Anxiety, Depression: Denies    Substance Abuse History: As of 24  Types:Denies all, including illicit  Withdrawal Symptoms:Denies  Longest Period Sober:Not Applicable   AA: Not applicable   Legal: n/a    Social History: As of 24  Martial Status:  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, 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 approximately 1.5 yrs ago  Kids:Yes or If so, how many 2  (adults)  House:Lives in a house   History: Denies  Access to Guns:  No ( has some though patient denied knowledge of location)    Social History     Socioeconomic History    Marital status:     Number of children: 2   Tobacco Use    Smoking status: Never    Smokeless tobacco: Never   Vaping Use    Vaping status: Never Used   Substance and Sexual Activity    Alcohol use: Yes     Comment: social    Drug use: Never    Sexual activity: Yes     Partners: Male     Birth control/protection: Other     Comment:  with vasectomy        Family History:   Suicide Attempts: Denies  Suicide Completions:Denies      Family History   Problem Relation Age of Onset    Arthritis Mother     Diabetes Mother     Anxiety disorder Sister     Depression Sister     No Known Problems Brother        Developmental History:   Born: [place]  Siblings:1 sister (doesn't talk on a regular basis)1 brother (talks maybe 2 times a year)  Childhood: Denies Abuse  High School:Completed  College:Denies    Mental Status Exam:   Hygiene:   good  Cooperation:  Cooperative  Eye Contact:  Good  Psychomotor Behavior:  Appropriate  Affect:  Appropriate  Mood: sad, depressed, and anxious  Speech:  Normal  Thought Process:  Goal directed  Thought Content:  Mood congruent  Suicidal:  None  Homicidal:  None  Hallucinations:  None  Delusion:  None  Memory:  Intact  Orientation:  Grossly intact  Reliability:  good  Insight:  Good  Judgement:  Good  Impulse Control:  Good  Physical/Medical Issues:  Yes Hypothyroidism      Review of Systems:  Review of Systems   Constitutional:  Positive for fatigue and unexpected weight change.   Respiratory:  Positive for shortness of breath.    Cardiovascular:  Negative for chest pain.   Gastrointestinal:  Negative for nausea.   Endocrine:        Hypothyroidism   Neurological:  Negative for dizziness and seizures.   Psychiatric/Behavioral:  Positive for decreased concentration and sleep disturbance. Negative for  "agitation, confusion, self-injury and suicidal ideas. The patient is nervous/anxious.          Physical Exam:  Physical Exam  Psychiatric:         Attention and Perception: Attention and perception normal.         Mood and Affect: Mood is anxious and depressed. Affect is tearful.         Speech: Speech normal.         Behavior: Behavior normal. Behavior is cooperative.         Thought Content: Thought content normal. Thought content does not include suicidal ideation. Thought content does not include suicidal plan.         Cognition and Memory: Cognition and memory normal.         Judgment: Judgment normal.         Vital Signs:   /82   Pulse 110   Ht 167.6 cm (66\")   Wt 99.3 kg (219 lb)   BMI 35.35 kg/m²      Lab Results:   Results Encounter on 10/24/2024   Component Date Value Ref Range Status    Cologuard 11/06/2024 Negative  Negative Final    Comment: NEGATIVE TEST RESULT. A negative Cologuard result indicates a low likelihood that a colorectal cancer (CRC) or advanced adenoma (adenomatous polyps with more advanced pre-malignant features)  is present. The chance that a person with a negative Cologuard test has a colorectal cancer is less than 1 in 1500 (negative predictive value >99.9%) or has an  advanced adenoma is less than  5.3% (negative predictive value 94.7%). These data are based on a prospective cross-sectional study of 10,000 individuals at average risk for colorectal cancer who were screened with both Cologuard and colonoscopy. (Susan Menjivar al, N Engl J Med 2014;370(14):8891-5584) The normal value (reference range) for this assay is negative.    COLOGUARD RE-SCREENING RECOMMENDATION: Periodic colorectal cancer screening is an important part of preventive healthcare for asymptomatic individuals at average risk for colorectal cancer.  Following a negative Cologuard result, the American Cancer Society and U.S.                            Multi-Society Task Force screening guidelines recommend " a Cologuard re-screening interval of 3 years.   References: American Cancer Society Guideline for Colorectal Cancer Screening: https://www.cancer.org/cancer/colon-rectal-cancer/fayowdsbe-hwiybrcvs-pnukuwt/acs-recommendations.html.; Kip RODRIGUEZ, Noreen ROLON, Nicole NAIDU, Colorectal Cancer Screening: Recommendations for Physicians and Patients from the U.S. Multi-Society Task Force on Colorectal Cancer Screening , Am J Gastroenterology 2017; 112:9987-1159.    TEST DESCRIPTION: Composite algorithmic analysis of stool DNA-biomarkers with hemoglobin immunoassay.   Quantitative values of individual biomarkers are not reportable and are not associated with individual biomarker result reference ranges. Cologuard is intended for colorectal cancer screening of adults of either sex, 45 years or older, who are at average-risk for colorectal cancer (CRC). Cologuard has been approved for use by the U.S. FDA. The performance of Cologuard was                            established in a cross sectional study of average-risk adults aged 50-84. Cologuard performance in patients ages 45 to 49 years was estimated by sub-group analysis of near-age groups. Colonoscopies performed for a positive result may find as the most clinically significant lesion: colorectal cancer [4.0%], advanced adenoma (including sessile serrated polyps greater than or equal to 1cm diameter) [20%] or non- advanced adenoma [31%]; or no colorectal neoplasia [45%]. These estimates are derived from a prospective cross-sectional screening study of 10,000 individuals at average risk for colorectal cancer who were screened with both Cologuard and colonoscopy. (Susan MAYS. et al, N Engl J Med 2014;370(14):3902-4940.) Cologuard may produce a false negative or false positive result (no colorectal cancer or precancerous polyp present at colonoscopy follow up). A negative Cologuard test result does not guarantee the absence of CRC or advanced adenoma (pre-cancer). The current  Cologuard                            screening interval is every 3 years. (American Cancer Society and U.S. Multi-Society Task Force). Cologuard performance data in a 10,000 patient pivotal study using colonoscopy as the reference method can be accessed at the following location: www.[employer]/results. Additional description of the Cologuard test process, warnings and precautions can be found at www.[employer].     Lab on 10/15/2024   Component Date Value Ref Range Status    Hepatitis C Ab 10/15/2024 Non-Reactive  Non-Reactive Final    Progesterone 10/15/2024 2.16  ng/mL Final    Estradiol 10/15/2024 76.2  pg/mL Final    Estrogen 10/15/2024 186  pg/mL Final                             Prepubertal             < 40                           Female Cycle:                             1-10 Days         16 - 328                             11-20 Days        34 - 501                             21-30 Days        48 - 350                             Post-Menopausal   40 - 244    Testosterone, Total 10/15/2024 3 (L)  4 - 50 ng/dL Final    Testosterone, Free 10/15/2024 <0.2  0.0 - 4.2 pg/mL Final    Glucose 10/15/2024 96  65 - 99 mg/dL Final    BUN 10/15/2024 10  6 - 20 mg/dL Final    Creatinine 10/15/2024 0.93  0.57 - 1.00 mg/dL Final    Sodium 10/15/2024 137  136 - 145 mmol/L Final    Potassium 10/15/2024 4.3  3.5 - 5.2 mmol/L Final    Chloride 10/15/2024 104  98 - 107 mmol/L Final    CO2 10/15/2024 26.7  22.0 - 29.0 mmol/L Final    Calcium 10/15/2024 9.1  8.6 - 10.5 mg/dL Final    Total Protein 10/15/2024 6.5  6.0 - 8.5 g/dL Final    Albumin 10/15/2024 3.8  3.5 - 5.2 g/dL Final    ALT (SGPT) 10/15/2024 8  1 - 33 U/L Final    AST (SGOT) 10/15/2024 9  1 - 32 U/L Final    Alkaline Phosphatase 10/15/2024 99  39 - 117 U/L Final    Total Bilirubin 10/15/2024 0.3  0.0 - 1.2 mg/dL Final    Globulin 10/15/2024 2.7  gm/dL Final    A/G Ratio 10/15/2024 1.4  g/dL Final    BUN/Creatinine Ratio 10/15/2024 10.8  7.0 - 25.0 Final     Anion Gap 10/15/2024 6.3  5.0 - 15.0 mmol/L Final    eGFR 10/15/2024 77.4  >60.0 mL/min/1.73 Final    Total Cholesterol 10/15/2024 185  0 - 200 mg/dL Final    Triglycerides 10/15/2024 117  0 - 150 mg/dL Final    HDL Cholesterol 10/15/2024 45  40 - 60 mg/dL Final    LDL Cholesterol  10/15/2024 119 (H)  0 - 100 mg/dL Final    VLDL Cholesterol 10/15/2024 21  5 - 40 mg/dL Final    LDL/HDL Ratio 10/15/2024 2.59   Final    TSH 10/15/2024 2.060  0.270 - 4.200 uIU/mL Final    25 Hydroxy, Vitamin D 10/15/2024 22.5 (L)  30.0 - 100.0 ng/ml Final    Folate 10/15/2024 7.61  4.78 - 24.20 ng/mL Final    Vitamin B-12 10/15/2024 930  211 - 946 pg/mL Final    WBC 10/15/2024 7.71  3.40 - 10.80 10*3/mm3 Final    RBC 10/15/2024 4.59  3.77 - 5.28 10*6/mm3 Final    Hemoglobin 10/15/2024 13.3  12.0 - 15.9 g/dL Final    Hematocrit 10/15/2024 42.3  34.0 - 46.6 % Final    MCV 10/15/2024 92.2  79.0 - 97.0 fL Final    MCH 10/15/2024 29.0  26.6 - 33.0 pg Final    MCHC 10/15/2024 31.4 (L)  31.5 - 35.7 g/dL Final    RDW 10/15/2024 13.0  12.3 - 15.4 % Final    RDW-SD 10/15/2024 44.5  37.0 - 54.0 fl Final    MPV 10/15/2024 10.4  6.0 - 12.0 fL Final    Platelets 10/15/2024 257  140 - 450 10*3/mm3 Final    Neutrophil % 10/15/2024 69.6  42.7 - 76.0 % Final    Lymphocyte % 10/15/2024 23.2  19.6 - 45.3 % Final    Monocyte % 10/15/2024 5.6  5.0 - 12.0 % Final    Eosinophil % 10/15/2024 1.0  0.3 - 6.2 % Final    Basophil % 10/15/2024 0.5  0.0 - 1.5 % Final    Immature Grans % 10/15/2024 0.1  0.0 - 0.5 % Final    Neutrophils, Absolute 10/15/2024 5.36  1.70 - 7.00 10*3/mm3 Final    Lymphocytes, Absolute 10/15/2024 1.79  0.70 - 3.10 10*3/mm3 Final    Monocytes, Absolute 10/15/2024 0.43  0.10 - 0.90 10*3/mm3 Final    Eosinophils, Absolute 10/15/2024 0.08  0.00 - 0.40 10*3/mm3 Final    Basophils, Absolute 10/15/2024 0.04  0.00 - 0.20 10*3/mm3 Final    Immature Grans, Absolute 10/15/2024 0.01  0.00 - 0.05 10*3/mm3 Final       EKG Results:  No orders to  display       Imaging Results:  No Images in the past 120 days found..      Assessment & Plan   Diagnoses and all orders for this visit:    1. Moderate episode of recurrent major depressive disorder (Primary)  -     Discontinue: FLUoxetine (PROzac) 20 MG capsule; Take 2 capsules by mouth Every Morning. Indications: Generalized Anxiety Disorder, Major Depressive Disorder, Social Anxiety Disorder  -     FLUoxetine (PROzac) 20 MG capsule; Take 2 capsules by mouth Every Morning. Indications: Generalized Anxiety Disorder, Major Depressive Disorder, Social Anxiety Disorder    2. Generalized anxiety disorder  -     Discontinue: FLUoxetine (PROzac) 20 MG capsule; Take 2 capsules by mouth Every Morning. Indications: Generalized Anxiety Disorder, Major Depressive Disorder, Social Anxiety Disorder  -     FLUoxetine (PROzac) 20 MG capsule; Take 2 capsules by mouth Every Morning. Indications: Generalized Anxiety Disorder, Major Depressive Disorder, Social Anxiety Disorder    3. Social anxiety disorder  -     Discontinue: FLUoxetine (PROzac) 20 MG capsule; Take 2 capsules by mouth Every Morning. Indications: Generalized Anxiety Disorder, Major Depressive Disorder, Social Anxiety Disorder  -     FLUoxetine (PROzac) 20 MG capsule; Take 2 capsules by mouth Every Morning. Indications: Generalized Anxiety Disorder, Major Depressive Disorder, Social Anxiety Disorder    4. Difficulty maintaining weight loss    5. Body image disturbance    6. Multifactorial weight gain    7. History of hypothyroidism    8. Medication management    9. Medication care plan discussed with patient        Visit Diagnoses:    ICD-10-CM ICD-9-CM   1. Moderate episode of recurrent major depressive disorder  F33.1 296.32   2. Generalized anxiety disorder  F41.1 300.02   3. Social anxiety disorder  F40.10 300.23   4. Difficulty maintaining weight loss  R68.89 780.99   5. Body image disturbance  F45.22 300.7   6. Multifactorial weight gain  R63.5 783.1   7. History  of hypothyroidism  Z86.39 V12.29   8. Medication management  Z79.899 V58.69   9. Medication care plan discussed with patient  Z71.89 V65.49       PLAN:  Safety: No acute safety concerns  Therapy: Declines   Patient educated and encouraged to start therapy to develop new coping skills and more adaptive ways of thinking about problems. These tools can help make positive changes. The benefits of counseling often last long after treatment sessions have stopped.  Risk Assessment: Risk of self-harm acutely is mild.  Risk factors include anxiety disorder and mood disorder.  Protective factors include no family history, denies access to guns/weapons, no present SI, no history of suicide attempts or self-harm in the past, minimal AODA, healthcare seeking, future orientation, willingness to engage in care.  Risk of self-harm chronically is also mild, but could be further elevated in the event of treatment noncompliance and/or AODA.  Meds:  INCREASE Prozac (Fluoxetine) 20 mg by mouth daily in the morning to target anxiety and depression. INSTRUCTED patient to start taking 2 of the 20 mg on hand to equal 40 mg and TO NOT TAKE DOSE TONIGHT AND START TAKING TOMORROW MORNING as patient has been taking at night.  Discussed all risks, benefits, alternatives, and side effects of Prozac (Fluoxetine) including but not limited to GI upset, decreased appetite, sexual dysfunction, bleeding risk, seizure risk, insomnia, anxiety, drowsiness, headache, tremor, nervousness, activation of berkley or hypomania, increased fragility fracture risk, hyponatremia, ocular effects, serotonin syndrome, hypersensitivity reaction, and activation of suicidal ideation and behavior. Discussed the need for patient to immediately call the office for any new or worsening symptoms, such as worsening depression; feeling nervous or restless; suicidal thoughts or actions; or other changes in mood or behavior, and all other concerns. Patient educated on medication  compliance.  Patient verbalized understanding and is agreeable to taking Prozac (Fluoxetine). Addressed all questions and concerns.    UPDATED ORDER AS A NO PRINT, as patient has adequate supply.  Re-entered order as a no print to [facility] as first change was sent to [facility].   Labs: n/a  Patient informed that going forward refills of future or current psychotropic medications previously prescribed by PCP will now be managed by this provider, and to contact provider MA INITIALLY when down to 5-7 days remaining to ensure new refill(s) will have this provider name on prescription for future refills. Explained to patient if requested from current bottle, via mychart, or via pharmacy the request would be directed to ordering provider. And to prevent confusion, inaccurate dosing, inaccurate medication refills, the management of psychotropic and/or mental health medications should only be ordered by this mental health provider. Patient verbalized understanding and agreed to proceed.      Advised patient to sign up for text alerts with current pharmacy, which will inform patient when a medication is ready, cost of medication, and when a refill is needed.    Patient informed if a medication is requested via telephone to office, MyChart, or with pharmacy directly, to check with their pharmacy (via phone, sonu) or [employer] to check status of those requests before contacting the office.     Coping mechanisms discussed with patient today as a way to gain control over feelings to prevent situation or panic attack from getting worse: grounding techniques using 5 senses (name 3 things you can see, smell, touch, etc.), slow paced breathing techniques- focus on door inhaling and exhaling at each corner, application of cold compress/ice pack/water on face or opening freezer door to allow cold air to be breathed in taking slow deep breaths, closing eyes and focus on positive thoughts.   Discussed and given patient the following  education materials:  -Grounding Techniques, Cognitive distortions, 5 ways to defuse anxious thoughts, and What is Mindfulness with tips printout given to patient, provided by [employer] -How to Stop Over thinking Tips and coping strategies print out given to patient today from [facility].      Patient presentation seems most consistent with anxiety, depression, social anxiety which is suspected due to body image disturbance due to difficulty losing weight since diagnosed with hypothyroidism approximately 4 yrs ago, patient became very emotional about negative self concept due to weight gain has avoided going out in public, though still tends to have some difficulty in social settings.  Weight gain trend reviewed and is suspected as multifactorial-life changes regarding work, health, possible medication related though weight has been trending up over the last 4 yrs.        The plan was discussed with the patient. The patient was given time to ask questions and these questions were answered. At the conclusion of their visit they had no additional questions or concerns and all questions were answered to their satisfaction.   Patient was given instructions and counseling regarding condition and for health maintenance advice. Please see specific information pulled into the AVS if appropriate.   Patient to contact provider if symptoms worsen or fail to improve.        Patient screened positive for depression based on a PHQ-9 score of 10 on 1/2/2025. Follow-up recommendations include: Prescribed antidepressant medication treatment and Suicide Risk Assessment performed.       TREATMENT PLAN/GOALS: Continue supportive psychotherapy efforts and medications as indicated. Treatment and medication options discussed during today's visit. Patient acknowledged and verbally consented to continue with current treatment plan and was educated on the importance of compliance with treatment and follow-up  appointments.    MEDICATION ISSUES:  EM reviewed as expected.  Discussed medication options and treatment plan of prescribed medication as well as the risks, benefits, and side effects including potential falls, possible impaired driving and metabolic adversities among others. Patient is agreeable to call the office with any worsening of symptoms or onset of side effects. Patient is agreeable to call 911 or go to the nearest ER should he/she begin having SI/HI. No medication side effects or related complaints today.     MEDS ORDERED DURING VISIT:  New Medications Ordered This Visit   Medications    FLUoxetine (PROzac) 20 MG capsule     Sig: Take 2 capsules by mouth Every Morning. Indications: Generalized Anxiety Disorder, Major Depressive Disorder, Social Anxiety Disorder     Increasing dose, has adequate supply. All psychotropic medications to come from this provider. RESENDING TO [email or web address] to update profile.       Return in about 7 weeks (around 2/20/2025) for medication check.         I spent 65 minutes caring for April on this date of service. This time includes time spent by me in the following activities: preparing for the visit, reviewing tests, obtaining and/or reviewing a separately obtained history, performing a medically appropriate examination and/or evaluation, counseling and educating the patient/family/caregiver, ordering medications, tests, or procedures, referring and communicating with other health care professionals, documenting information in the medical record, care coordination, and scheduling .      This document has been electronically signed by CARMELA Esquivel  January 2, 2025 17:33 EST           Part of this note may be an electronic transcription/translation of spoken language to printed text using the Dragon Dictation System.

## 2025-01-30 ENCOUNTER — OFFICE VISIT (OUTPATIENT)
Dept: FAMILY MEDICINE CLINIC | Age: 45
End: 2025-01-30
Payer: COMMERCIAL

## 2025-01-30 VITALS
HEIGHT: 66 IN | DIASTOLIC BLOOD PRESSURE: 83 MMHG | WEIGHT: 217.6 LBS | OXYGEN SATURATION: 98 % | TEMPERATURE: 98.1 F | SYSTOLIC BLOOD PRESSURE: 118 MMHG | HEART RATE: 85 BPM | BODY MASS INDEX: 34.97 KG/M2

## 2025-01-30 DIAGNOSIS — F43.23 ADJUSTMENT REACTION WITH ANXIETY AND DEPRESSION: ICD-10-CM

## 2025-01-30 DIAGNOSIS — E03.9 HYPOTHYROIDISM, UNSPECIFIED TYPE: Primary | ICD-10-CM

## 2025-01-30 PROCEDURE — 99213 OFFICE O/P EST LOW 20 MIN: CPT | Performed by: NURSE PRACTITIONER

## 2025-01-30 RX ORDER — LEVOTHYROXINE SODIUM 50 UG/1
50 TABLET ORAL DAILY
Qty: 90 TABLET | Refills: 3 | Status: SHIPPED | OUTPATIENT
Start: 2025-01-30

## 2025-01-30 NOTE — PROGRESS NOTES
"Chief Complaint  Weight Gain (Discuss weight loss meds, hasn't been able to lose weight)    Subjective        April Hawkins presents to [facility] today for follow-up on hypothyroidism, taking 50 mcg levothyroxine, last TSH done 10/15/2024 was 2.0.    Follow-up on anxiety and depression, taking Prozac 20 mg daily, did see Deepthi Rogers however decided not to increase to 40 mg daily feels like the 20 mg daily is working well.    Also here to discuss weight loss.  Has been using my fitness pal type sonu, to track food and activity, walking 2 miles every morning and every night, watching her calorie intake and drinking ample amount of fluids.  However in the last 6 weeks that she has not lost any weight.  Would like to try a weight loss medication and is willing to come monthly for visits for monitoring of weight loss.      Current Outpatient Medications:     FLUoxetine (PROzac) 20 MG capsule, Take 1 capsule by mouth Every Morning. Indications: Generalized Anxiety Disorder, Major Depressive Disorder, Social Anxiety Disorder, Disp: 90 capsule, Rfl: 3    levothyroxine (Synthroid) 50 MCG tablet, Take 1 tablet by mouth Daily., Disp: 90 tablet, Rfl: 3    Tirzepatide-Weight Management (ZEPBOUND) 2.5 MG/0.5ML solution auto-injector, Inject 0.5 mL under the skin into the appropriate area as directed 1 (One) Time Per Week., Disp: 2 mL, Rfl: 1  Medications Discontinued During This Encounter   Medication Reason    levothyroxine (Synthroid) 50 MCG tablet Reorder    FLUoxetine (PROzac) 20 MG capsule Reorder         Allergies:  Cephalexin and Adhesive tape      Objective   Vital Signs:   Vitals:    01/30/25 1126   BP: 118/83   BP Location: Left arm   Patient Position: Sitting   Cuff Size: Adult   Pulse: 85   Temp: 98.1 °F (36.7 °C)   TempSrc: Oral   SpO2: 98%   Weight: 98.7 kg (217 lb 9.6 oz)   Height: 167.6 cm (65.98\")     Body mass index is 35.14 kg/m².           Physical Exam  Constitutional:       " Appearance: She is obese.   Neck:      Vascular: No carotid bruit.   Cardiovascular:      Rate and Rhythm: Normal rate and regular rhythm.      Heart sounds: Normal heart sounds.   Pulmonary:      Effort: Pulmonary effort is normal.      Breath sounds: Normal breath sounds.   Musculoskeletal:         General: Normal range of motion.   Skin:     General: Skin is warm and dry.   Neurological:      General: No focal deficit present.      Mental Status: She is alert.   Psychiatric:         Mood and Affect: Mood normal.         Behavior: Behavior normal.             Lab Results   Component Value Date    GLUCOSE 96 10/15/2024    BUN 10 10/15/2024    CREATININE 0.93 10/15/2024    EGFRIFNONA 85 12/10/2021    BCR 10.8 10/15/2024    K 4.3 10/15/2024    CO2 26.7 10/15/2024    CALCIUM 9.1 10/15/2024    ALBUMIN 3.8 10/15/2024    AST 9 10/15/2024    ALT 8 10/15/2024       Lab Results   Component Value Date    CHOL 185 10/15/2024    TRIG 117 10/15/2024    HDL 45 10/15/2024     (H) 10/15/2024       Lab Results   Component Value Date    WBC 7.71 10/15/2024    HGB 13.3 10/15/2024    HCT 42.3 10/15/2024    MCV 92.2 10/15/2024     10/15/2024           Procedures         Diagnoses and all orders for this visit:    1. Hypothyroidism, unspecified type (Primary)  -     levothyroxine (Synthroid) 50 MCG tablet; Take 1 tablet by mouth Daily.  Dispense: 90 tablet; Refill: 3    2. Adjustment reaction with anxiety and depression  -     FLUoxetine (PROzac) 20 MG capsule; Take 1 capsule by mouth Every Morning. Indications: Generalized Anxiety Disorder, Major Depressive Disorder, Social Anxiety Disorder  Dispense: 90 capsule; Refill: 3    3. BMI 35.0-35.9,adult  -     Tirzepatide-Weight Management (ZEPBOUND) 2.5 MG/0.5ML solution auto-injector; Inject 0.5 mL under the skin into the appropriate area as directed 1 (One) Time Per Week.  Dispense: 2 mL; Refill: 1            Follow Up  Return in about 4 weeks (around 2/27/2025) for  Recheck.  Patient was given instructions and counseling regarding her condition or for health maintenance advice. Please see specific information pulled into the AVS if appropriate.           Catalino Mccoy, APRN  01/30/2025    Please note that portions of this document were completed using a voice recognition program.

## 2025-02-01 ENCOUNTER — DOCUMENTATION (OUTPATIENT)
Dept: FAMILY MEDICINE CLINIC | Age: 45
End: 2025-02-01
Payer: COMMERCIAL

## 2025-02-25 ENCOUNTER — DOCUMENTATION (OUTPATIENT)
Dept: FAMILY MEDICINE CLINIC | Age: 45
End: 2025-02-25
Payer: COMMERCIAL

## 2025-05-09 DIAGNOSIS — E03.9 HYPOTHYROIDISM, UNSPECIFIED TYPE: ICD-10-CM

## 2025-05-09 RX ORDER — LEVOTHYROXINE SODIUM 50 UG/1
50 TABLET ORAL DAILY
Qty: 90 TABLET | Refills: 1 | Status: SHIPPED | OUTPATIENT
Start: 2025-05-09

## 2025-06-30 ENCOUNTER — TELEPHONE (OUTPATIENT)
Dept: FAMILY MEDICINE CLINIC | Age: 45
End: 2025-06-30
Payer: COMMERCIAL

## 2025-06-30 NOTE — TELEPHONE ENCOUNTER
Call and let her know her insurance as of July 1 , will no longer cover Zepbound but they will cover wegovy , does she want to switch or ?

## 2025-07-31 ENCOUNTER — TELEPHONE (OUTPATIENT)
Dept: FAMILY MEDICINE CLINIC | Age: 45
End: 2025-07-31
Payer: COMMERCIAL

## 2025-07-31 DIAGNOSIS — R60.1 GENERALIZED EDEMA: Primary | ICD-10-CM

## 2025-07-31 RX ORDER — HYDROCHLOROTHIAZIDE 25 MG/1
25 TABLET ORAL DAILY PRN
Qty: 30 TABLET | Refills: 1 | Status: SHIPPED | OUTPATIENT
Start: 2025-07-31

## 2025-08-14 DIAGNOSIS — E03.9 HYPOTHYROIDISM, UNSPECIFIED TYPE: ICD-10-CM

## 2025-08-14 RX ORDER — LEVOTHYROXINE SODIUM 50 UG/1
50 TABLET ORAL DAILY
Qty: 90 TABLET | Refills: 1 | Status: SHIPPED | OUTPATIENT
Start: 2025-08-14

## 2025-08-14 RX ORDER — SEMAGLUTIDE 1 MG/.5ML
1 INJECTION, SOLUTION SUBCUTANEOUS WEEKLY
Qty: 2 ML | Refills: 0 | Status: SHIPPED | OUTPATIENT
Start: 2025-08-14

## 2025-08-14 RX ORDER — SEMAGLUTIDE 0.5 MG/.5ML
0.5 INJECTION, SOLUTION SUBCUTANEOUS WEEKLY
Qty: 2 ML | Refills: 0 | Status: SHIPPED | OUTPATIENT
Start: 2025-08-14 | End: 2025-08-14

## 2025-08-26 ENCOUNTER — OFFICE VISIT (OUTPATIENT)
Dept: FAMILY MEDICINE CLINIC | Age: 45
End: 2025-08-26
Payer: COMMERCIAL

## 2025-08-26 VITALS
BODY MASS INDEX: 31.88 KG/M2 | HEIGHT: 66 IN | DIASTOLIC BLOOD PRESSURE: 87 MMHG | TEMPERATURE: 98.2 F | HEART RATE: 84 BPM | SYSTOLIC BLOOD PRESSURE: 120 MMHG | WEIGHT: 198.4 LBS | OXYGEN SATURATION: 98 %

## 2025-08-26 DIAGNOSIS — R63.4 WEIGHT LOSS: ICD-10-CM

## 2025-08-26 DIAGNOSIS — Z12.31 SCREENING MAMMOGRAM FOR BREAST CANCER: ICD-10-CM

## 2025-08-26 DIAGNOSIS — Z13.220 SCREENING, LIPID: ICD-10-CM

## 2025-08-26 DIAGNOSIS — Z00.00 ANNUAL PHYSICAL EXAM: Primary | ICD-10-CM

## 2025-08-26 DIAGNOSIS — H69.92 DYSFUNCTION OF LEFT EUSTACHIAN TUBE: ICD-10-CM

## 2025-08-26 DIAGNOSIS — E03.9 HYPOTHYROIDISM, UNSPECIFIED TYPE: ICD-10-CM

## 2025-08-26 RX ORDER — OFLOXACIN 3 MG/ML
5 SOLUTION AURICULAR (OTIC) 2 TIMES DAILY
Qty: 10 ML | Refills: 0 | Status: SHIPPED | OUTPATIENT
Start: 2025-08-26